# Patient Record
Sex: MALE | Race: WHITE | NOT HISPANIC OR LATINO | URBAN - METROPOLITAN AREA
[De-identification: names, ages, dates, MRNs, and addresses within clinical notes are randomized per-mention and may not be internally consistent; named-entity substitution may affect disease eponyms.]

---

## 2017-05-25 ENCOUNTER — EMERGENCY (EMERGENCY)
Facility: HOSPITAL | Age: 42
LOS: 1 days | Discharge: HOME | End: 2017-05-25
Attending: EMERGENCY MEDICINE | Admitting: EMERGENCY MEDICINE
Payer: MEDICAID

## 2017-05-25 VITALS
OXYGEN SATURATION: 96 % | RESPIRATION RATE: 16 BRPM | TEMPERATURE: 99 F | HEART RATE: 69 BPM | SYSTOLIC BLOOD PRESSURE: 141 MMHG | DIASTOLIC BLOOD PRESSURE: 85 MMHG

## 2017-05-25 VITALS — DIASTOLIC BLOOD PRESSURE: 89 MMHG | HEART RATE: 70 BPM | TEMPERATURE: 99 F | SYSTOLIC BLOOD PRESSURE: 120 MMHG

## 2017-05-25 DIAGNOSIS — Z79.899 OTHER LONG TERM (CURRENT) DRUG THERAPY: ICD-10-CM

## 2017-05-25 DIAGNOSIS — R07.89 OTHER CHEST PAIN: ICD-10-CM

## 2017-05-25 LAB
ALBUMIN SERPL ELPH-MCNC: 3.9 G/DL — SIGNIFICANT CHANGE UP (ref 3.4–5)
ALP SERPL-CCNC: 63 U/L — SIGNIFICANT CHANGE UP (ref 40–120)
ALT FLD-CCNC: 44 U/L — HIGH (ref 12–42)
ANION GAP SERPL CALC-SCNC: 7 MMOL/L — LOW (ref 9–16)
AST SERPL-CCNC: 22 U/L — SIGNIFICANT CHANGE UP (ref 15–37)
BASOPHILS NFR BLD AUTO: 0.7 % — SIGNIFICANT CHANGE UP (ref 0–2)
BILIRUB SERPL-MCNC: 0.4 MG/DL — SIGNIFICANT CHANGE UP (ref 0.2–1.2)
BUN SERPL-MCNC: 21 MG/DL — SIGNIFICANT CHANGE UP (ref 7–23)
CALCIUM SERPL-MCNC: 8.9 MG/DL — SIGNIFICANT CHANGE UP (ref 8.5–10.5)
CHLORIDE SERPL-SCNC: 107 MMOL/L — SIGNIFICANT CHANGE UP (ref 96–108)
CK MB BLD-MCNC: 0.78 % — SIGNIFICANT CHANGE UP
CK MB CFR SERPL CALC: 0.7 NG/ML — SIGNIFICANT CHANGE UP (ref 0.5–3.6)
CK SERPL-CCNC: 90 U/L — SIGNIFICANT CHANGE UP (ref 39–308)
CO2 SERPL-SCNC: 26 MMOL/L — SIGNIFICANT CHANGE UP (ref 22–31)
CREAT SERPL-MCNC: 1.06 MG/DL — SIGNIFICANT CHANGE UP (ref 0.5–1.3)
EOSINOPHIL NFR BLD AUTO: 2.7 % — SIGNIFICANT CHANGE UP (ref 0–6)
GLUCOSE SERPL-MCNC: 104 MG/DL — HIGH (ref 70–99)
HCT VFR BLD CALC: 39.2 % — SIGNIFICANT CHANGE UP (ref 39–50)
HGB BLD-MCNC: 14 G/DL — SIGNIFICANT CHANGE UP (ref 13–17)
IMM GRANULOCYTES NFR BLD AUTO: 0.4 % — SIGNIFICANT CHANGE UP (ref 0–1.5)
LYMPHOCYTES # BLD AUTO: 20.8 % — SIGNIFICANT CHANGE UP (ref 13–44)
MCHC RBC-ENTMCNC: 32.3 PG — SIGNIFICANT CHANGE UP (ref 27–34)
MCHC RBC-ENTMCNC: 35.7 G/DL — SIGNIFICANT CHANGE UP (ref 32–36)
MCV RBC AUTO: 90.3 FL — SIGNIFICANT CHANGE UP (ref 80–100)
MONOCYTES NFR BLD AUTO: 6 % — SIGNIFICANT CHANGE UP (ref 2–14)
NEUTROPHILS NFR BLD AUTO: 69.4 % — SIGNIFICANT CHANGE UP (ref 43–77)
PLATELET # BLD AUTO: 390 K/UL — SIGNIFICANT CHANGE UP (ref 150–400)
POTASSIUM SERPL-MCNC: 3.9 MMOL/L — SIGNIFICANT CHANGE UP (ref 3.5–5.3)
POTASSIUM SERPL-SCNC: 3.9 MMOL/L — SIGNIFICANT CHANGE UP (ref 3.5–5.3)
PROT SERPL-MCNC: 7.2 G/DL — SIGNIFICANT CHANGE UP (ref 6.4–8.2)
RBC # BLD: 4.34 M/UL — SIGNIFICANT CHANGE UP (ref 4.2–5.8)
RBC # FLD: 11.9 % — SIGNIFICANT CHANGE UP (ref 10.3–16.9)
SODIUM SERPL-SCNC: 140 MMOL/L — SIGNIFICANT CHANGE UP (ref 132–145)
TROPONIN I SERPL-MCNC: <0.017 NG/ML — LOW (ref 0.02–0.06)
WBC # BLD: 7.1 K/UL — SIGNIFICANT CHANGE UP (ref 3.8–10.5)
WBC # FLD AUTO: 7.1 K/UL — SIGNIFICANT CHANGE UP (ref 3.8–10.5)

## 2017-05-25 PROCEDURE — 93010 ELECTROCARDIOGRAM REPORT: CPT

## 2017-05-25 PROCEDURE — 71020: CPT | Mod: 26

## 2017-05-25 PROCEDURE — 99285 EMERGENCY DEPT VISIT HI MDM: CPT | Mod: 25

## 2017-05-25 RX ORDER — ACETAMINOPHEN 500 MG
650 TABLET ORAL ONCE
Qty: 0 | Refills: 0 | Status: COMPLETED | OUTPATIENT
Start: 2017-05-25 | End: 2017-05-25

## 2017-05-25 RX ORDER — IBUPROFEN 200 MG
800 TABLET ORAL ONCE
Qty: 0 | Refills: 0 | Status: COMPLETED | OUTPATIENT
Start: 2017-05-25 | End: 2017-05-25

## 2017-05-25 RX ORDER — ASPIRIN/CALCIUM CARB/MAGNESIUM 324 MG
325 TABLET ORAL ONCE
Qty: 0 | Refills: 0 | Status: COMPLETED | OUTPATIENT
Start: 2017-05-25 | End: 2017-05-25

## 2017-05-25 RX ORDER — FAMOTIDINE 10 MG/ML
20 INJECTION INTRAVENOUS ONCE
Qty: 0 | Refills: 0 | Status: COMPLETED | OUTPATIENT
Start: 2017-05-25 | End: 2017-05-25

## 2017-05-25 RX ADMIN — FAMOTIDINE 20 MILLIGRAM(S): 10 INJECTION INTRAVENOUS at 11:08

## 2017-05-25 RX ADMIN — Medication 325 MILLIGRAM(S): at 11:07

## 2017-05-25 RX ADMIN — Medication 650 MILLIGRAM(S): at 11:06

## 2017-05-25 RX ADMIN — Medication 800 MILLIGRAM(S): at 11:06

## 2017-05-25 NOTE — ED PROVIDER NOTE - OBJECTIVE STATEMENT
42 y.o. male with /co 5 days right sided chest pain, constant, 42 y.o. male with /co 5 days right sided chest pain, constant, ongoing for 5 days, somwhat worse with movement, not xertional, pt plays soccerr and runs a few times a week with no chest pain or SOB, no trauma no recent heavy lifting, opt has hx RUE muscle spasm from when he was a child, Denies fever, chills, palpitations, diaphoresis, FAULKNER, SOB, PND, exertional sx, orthopnea, cough, hemoptysis, wheezing, peripheral edema, focal weakness, numbness, tingling, paresthesia, HA, dizziness, neck pain, N/V/D/C, abdominal pain, change in urinary/bowel function, trauma, fall, rash, and malaise.

## 2017-05-25 NOTE — ED PROVIDER NOTE - MEDICAL DECISION MAKING DETAILS
chest wall pain, chest wall pain, EKG and cardiac enzymes negative, stable for dc home with outpatient cardiology followup.

## 2017-05-25 NOTE — ED ADULT NURSE NOTE - CHPI ED SYMPTOMS NEG
no shortness of breath/no back pain/no vomiting/no nausea/no syncope/no chills/no dizziness/no fever

## 2017-05-25 NOTE — ED PROVIDER NOTE - CARDIAC, MLM
Normal rate, regular rhythm.  Heart sounds S1, S2.  No murmurs, rubs or gallops. Normal rate, regular rhythm.  Heart sounds S1, S2.  No murmurs, rubs or gallops. no chest wall swelling or tenderness

## 2017-10-16 ENCOUNTER — EMERGENCY (EMERGENCY)
Facility: HOSPITAL | Age: 42
LOS: 1 days | Discharge: HOME | End: 2017-10-16
Attending: EMERGENCY MEDICINE | Admitting: EMERGENCY MEDICINE
Payer: SELF-PAY

## 2017-10-16 VITALS
HEART RATE: 69 BPM | HEIGHT: 63 IN | OXYGEN SATURATION: 98 % | TEMPERATURE: 99 F | WEIGHT: 179.9 LBS | DIASTOLIC BLOOD PRESSURE: 80 MMHG | RESPIRATION RATE: 17 BRPM | SYSTOLIC BLOOD PRESSURE: 114 MMHG

## 2017-10-16 DIAGNOSIS — R10.33 PERIUMBILICAL PAIN: ICD-10-CM

## 2017-10-16 DIAGNOSIS — R11.0 NAUSEA: ICD-10-CM

## 2017-10-16 DIAGNOSIS — Z79.52 LONG TERM (CURRENT) USE OF SYSTEMIC STEROIDS: ICD-10-CM

## 2017-10-16 DIAGNOSIS — R63.0 ANOREXIA: ICD-10-CM

## 2017-10-16 DIAGNOSIS — Z79.899 OTHER LONG TERM (CURRENT) DRUG THERAPY: ICD-10-CM

## 2017-10-16 DIAGNOSIS — R19.7 DIARRHEA, UNSPECIFIED: ICD-10-CM

## 2017-10-16 LAB
BASOPHILS NFR BLD AUTO: 0.5 % — SIGNIFICANT CHANGE UP (ref 0–2)
EOSINOPHIL NFR BLD AUTO: 6.9 % — HIGH (ref 0–6)
HCT VFR BLD CALC: 30.4 % — LOW (ref 39–50)
HGB BLD-MCNC: 10.5 G/DL — LOW (ref 13–17)
IMM GRANULOCYTES NFR BLD AUTO: 0.2 % — SIGNIFICANT CHANGE UP (ref 0–1.5)
LIDOCAIN IGE QN: 97 U/L — SIGNIFICANT CHANGE UP (ref 73–393)
LYMPHOCYTES # BLD AUTO: 21 % — SIGNIFICANT CHANGE UP (ref 13–44)
MCHC RBC-ENTMCNC: 31.7 PG — SIGNIFICANT CHANGE UP (ref 27–34)
MCHC RBC-ENTMCNC: 34.5 G/DL — SIGNIFICANT CHANGE UP (ref 32–36)
MCV RBC AUTO: 91.8 FL — SIGNIFICANT CHANGE UP (ref 80–100)
MONOCYTES NFR BLD AUTO: 7.3 % — SIGNIFICANT CHANGE UP (ref 2–14)
NEUTROPHILS NFR BLD AUTO: 64.1 % — SIGNIFICANT CHANGE UP (ref 43–77)
PLATELET # BLD AUTO: 267 K/UL — SIGNIFICANT CHANGE UP (ref 150–400)
RBC # BLD: 3.31 M/UL — LOW (ref 4.2–5.8)
RBC # FLD: 12.2 % — SIGNIFICANT CHANGE UP (ref 10.3–16.9)
WBC # BLD: 6.3 K/UL — SIGNIFICANT CHANGE UP (ref 3.8–10.5)
WBC # FLD AUTO: 6.3 K/UL — SIGNIFICANT CHANGE UP (ref 3.8–10.5)

## 2017-10-16 PROCEDURE — 99053 MED SERV 10PM-8AM 24 HR FAC: CPT

## 2017-10-16 PROCEDURE — 99285 EMERGENCY DEPT VISIT HI MDM: CPT | Mod: 25

## 2017-10-16 RX ORDER — IOHEXOL 300 MG/ML
50 INJECTION, SOLUTION INTRAVENOUS ONCE
Qty: 0 | Refills: 0 | Status: COMPLETED | OUTPATIENT
Start: 2017-10-16 | End: 2017-10-16

## 2017-10-16 RX ORDER — MORPHINE SULFATE 50 MG/1
4 CAPSULE, EXTENDED RELEASE ORAL ONCE
Qty: 0 | Refills: 0 | Status: DISCONTINUED | OUTPATIENT
Start: 2017-10-16 | End: 2017-10-16

## 2017-10-16 RX ORDER — SODIUM CHLORIDE 9 MG/ML
1000 INJECTION INTRAMUSCULAR; INTRAVENOUS; SUBCUTANEOUS ONCE
Qty: 0 | Refills: 0 | Status: COMPLETED | OUTPATIENT
Start: 2017-10-16 | End: 2017-10-16

## 2017-10-16 RX ORDER — FAMOTIDINE 10 MG/ML
20 INJECTION INTRAVENOUS ONCE
Qty: 0 | Refills: 0 | Status: COMPLETED | OUTPATIENT
Start: 2017-10-16 | End: 2017-10-16

## 2017-10-16 RX ORDER — ONDANSETRON 8 MG/1
4 TABLET, FILM COATED ORAL ONCE
Qty: 0 | Refills: 0 | Status: COMPLETED | OUTPATIENT
Start: 2017-10-16 | End: 2017-10-16

## 2017-10-16 RX ADMIN — ONDANSETRON 4 MILLIGRAM(S): 8 TABLET, FILM COATED ORAL at 23:44

## 2017-10-16 RX ADMIN — IOHEXOL 50 MILLILITER(S): 300 INJECTION, SOLUTION INTRAVENOUS at 23:44

## 2017-10-16 RX ADMIN — SODIUM CHLORIDE 1000 MILLILITER(S): 9 INJECTION INTRAMUSCULAR; INTRAVENOUS; SUBCUTANEOUS at 23:44

## 2017-10-16 RX ADMIN — MORPHINE SULFATE 4 MILLIGRAM(S): 50 CAPSULE, EXTENDED RELEASE ORAL at 23:44

## 2017-10-16 NOTE — ED PROVIDER NOTE - PROGRESS NOTE DETAILS
patient eating and drinking with no pain which resolved, ct results discussed. he is feeling much better. will dc, with strict return precautions. will dc, agrees with plan.

## 2017-10-16 NOTE — ED PROVIDER NOTE - MEDICAL DECISION MAKING DETAILS
ct abd/p rule out colitis, divertic, appy. patient otherwise nontoxic appearing, analgesics, ivf, ua and abd labs.

## 2017-10-16 NOTE — ED ADULT NURSE NOTE - CHPI ED SYMPTOMS NEG
no vomiting/no burning urination/no chills/no dysuria/no nausea/no hematuria/no abdominal distension/no blood in stool/no fever

## 2017-10-16 NOTE — ED ADULT NURSE NOTE - OBJECTIVE STATEMENT
no N/V some diarrhea non bloody  started yesterday Pt came in c/o midabdominal pain that began yesterday with nonbloody diarrhea. Pt denies any nauseousness. Pt denies any N/V, no fever/chills, no CP/SOB. Pt reports no change in PO intake.

## 2017-10-16 NOTE — ED PROVIDER NOTE - OBJECTIVE STATEMENT
Patient with no significant pmhx, presents with periumbilical abdomina pain for one day, associated with mild nausea, and decreased appetite. also notes 3 episodes of watery diarrhea pta. denies fever, chills, travel or sick contacts. denies urinary symptoms, bloody stool or hx of abd surgeries. social drinker, denies drugs or smoking.

## 2017-10-17 VITALS
OXYGEN SATURATION: 99 % | HEART RATE: 60 BPM | RESPIRATION RATE: 16 BRPM | SYSTOLIC BLOOD PRESSURE: 128 MMHG | DIASTOLIC BLOOD PRESSURE: 79 MMHG

## 2017-10-17 LAB
ALBUMIN SERPL ELPH-MCNC: 3.6 G/DL — SIGNIFICANT CHANGE UP (ref 3.4–5)
ALP SERPL-CCNC: 81 U/L — SIGNIFICANT CHANGE UP (ref 40–120)
ALT FLD-CCNC: 21 U/L — SIGNIFICANT CHANGE UP (ref 12–42)
ANION GAP SERPL CALC-SCNC: 7 MMOL/L — LOW (ref 9–16)
APPEARANCE UR: CLEAR — SIGNIFICANT CHANGE UP
AST SERPL-CCNC: 32 U/L — SIGNIFICANT CHANGE UP (ref 15–37)
BILIRUB SERPL-MCNC: 0.4 MG/DL — SIGNIFICANT CHANGE UP (ref 0.2–1.2)
BILIRUB UR-MCNC: NEGATIVE — SIGNIFICANT CHANGE UP
BUN SERPL-MCNC: 15 MG/DL — SIGNIFICANT CHANGE UP (ref 7–23)
CALCIUM SERPL-MCNC: 8.3 MG/DL — LOW (ref 8.5–10.5)
CHLORIDE SERPL-SCNC: 106 MMOL/L — SIGNIFICANT CHANGE UP (ref 96–108)
CO2 SERPL-SCNC: 26 MMOL/L — SIGNIFICANT CHANGE UP (ref 22–31)
COLOR SPEC: YELLOW — SIGNIFICANT CHANGE UP
CREAT SERPL-MCNC: 0.86 MG/DL — SIGNIFICANT CHANGE UP (ref 0.5–1.3)
DIFF PNL FLD: NEGATIVE — SIGNIFICANT CHANGE UP
GLUCOSE SERPL-MCNC: 100 MG/DL — HIGH (ref 70–99)
GLUCOSE UR QL: NEGATIVE — SIGNIFICANT CHANGE UP
KETONES UR-MCNC: NEGATIVE — SIGNIFICANT CHANGE UP
LEUKOCYTE ESTERASE UR-ACNC: NEGATIVE — SIGNIFICANT CHANGE UP
MAGNESIUM SERPL-MCNC: 2 MG/DL — SIGNIFICANT CHANGE UP (ref 1.6–2.6)
NITRITE UR-MCNC: NEGATIVE — SIGNIFICANT CHANGE UP
PH UR: 5.5 — SIGNIFICANT CHANGE UP (ref 5–8)
POTASSIUM SERPL-MCNC: 4.7 MMOL/L — SIGNIFICANT CHANGE UP (ref 3.5–5.3)
POTASSIUM SERPL-SCNC: 4.7 MMOL/L — SIGNIFICANT CHANGE UP (ref 3.5–5.3)
PROT SERPL-MCNC: 7.3 G/DL — SIGNIFICANT CHANGE UP (ref 6.4–8.2)
PROT UR-MCNC: NEGATIVE MG/DL — SIGNIFICANT CHANGE UP
SODIUM SERPL-SCNC: 139 MMOL/L — SIGNIFICANT CHANGE UP (ref 132–145)
SP GR SPEC: <=1.005 — SIGNIFICANT CHANGE UP (ref 1–1.03)
UROBILINOGEN FLD QL: 0.2 E.U./DL — SIGNIFICANT CHANGE UP

## 2017-10-17 PROCEDURE — 74177 CT ABD & PELVIS W/CONTRAST: CPT | Mod: 26

## 2017-10-17 RX ORDER — KETOROLAC TROMETHAMINE 30 MG/ML
30 SYRINGE (ML) INJECTION ONCE
Qty: 0 | Refills: 0 | Status: DISCONTINUED | OUTPATIENT
Start: 2017-10-17 | End: 2017-10-17

## 2017-10-17 RX ORDER — FAMOTIDINE 10 MG/ML
1 INJECTION INTRAVENOUS
Qty: 30 | Refills: 0
Start: 2017-10-17 | End: 2017-11-01

## 2017-10-17 RX ORDER — SUCRALFATE 1 G
10 TABLET ORAL
Qty: 400 | Refills: 0
Start: 2017-10-17 | End: 2017-11-01

## 2017-10-17 RX ADMIN — MORPHINE SULFATE 4 MILLIGRAM(S): 50 CAPSULE, EXTENDED RELEASE ORAL at 00:16

## 2017-10-17 RX ADMIN — FAMOTIDINE 20 MILLIGRAM(S): 10 INJECTION INTRAVENOUS at 00:52

## 2017-10-17 RX ADMIN — Medication 30 MILLIGRAM(S): at 03:46

## 2017-10-17 NOTE — ED ADULT NURSE REASSESSMENT NOTE - NS ED NURSE REASSESS COMMENT FT1
patient had iv contrast for ct abd; felt itching in throat after contrast given; feels better after test completes; no angioedema, no lip/tongue swelling, no wheezing, no hives/itching/rashes noted; will continue to monitor, VSS; MD Almeida made aware; pt speaking in complete sentences

## 2018-09-13 NOTE — ED PROVIDER NOTE - CROS ED HEME ALL NEG
Chief Complaint Patient presents with  Surgical Follow-up Ventral hernia repair 9/2/18 1. Have you been to the ER, urgent care clinic since your last visit? Hospitalized since your last visit? No 
 
2. Have you seen or consulted any other health care providers outside of the [address] [place] [address] since your last visit? Include any pap smears or colon screening.  No 
 
 negative...

## 2018-12-16 NOTE — ED PROVIDER NOTE - CPE EDP EYES NORM
Telephone Encounter by Sheela Drummond LPN at 07/22/17 10:58 AM     Author:  Sheela Drummond LPN Service:  (none) Author Type:  Licensed Nurse     Filed:  07/22/17 11:01 AM Encounter Date:  7/22/2017 Status:  Signed     :  Sheela Drummond LPN (Licensed Nurse)            To[DP1.1M] Dr Cameron[DP1.1T]   Camilla calling from [facility] [employer]  States she has a beal catheter    Completed Macrobid couple days ago last week   They have orders repeat the ua on the 23 rd    Today much more confused. dtr states this is newell pt presents herself when has a UTI[DP1.1M]  dtr told staff one time pt was in hospital d/t UTI for IV antibiotics and want to avoid that from happening again[DP1.2M]     Asking for orders:[DP1.1M]       Revision History        User Key Date/Time User Provider Type Action    > DP1.2 07/22/17 11:01 AM Sheela Drummond LPN Licensed Nurse Sign     DP1.1 07/22/17 10:58 AM Sheela Drummond LPN Licensed Nurse     M - Manual, T - Template             normal...

## 2019-05-27 ENCOUNTER — EMERGENCY (EMERGENCY)
Facility: HOSPITAL | Age: 44
LOS: 1 days | Discharge: HOME | End: 2019-05-27
Admitting: EMERGENCY MEDICINE
Payer: SELF-PAY

## 2019-05-27 VITALS
OXYGEN SATURATION: 98 % | HEART RATE: 83 BPM | RESPIRATION RATE: 16 BRPM | SYSTOLIC BLOOD PRESSURE: 117 MMHG | TEMPERATURE: 99 F | DIASTOLIC BLOOD PRESSURE: 78 MMHG

## 2019-05-27 VITALS
RESPIRATION RATE: 15 BRPM | OXYGEN SATURATION: 97 % | HEART RATE: 86 BPM | TEMPERATURE: 99 F | SYSTOLIC BLOOD PRESSURE: 119 MMHG | DIASTOLIC BLOOD PRESSURE: 79 MMHG

## 2019-05-27 LAB
ALBUMIN SERPL ELPH-MCNC: 3.7 G/DL — SIGNIFICANT CHANGE UP (ref 3.4–5)
ALP SERPL-CCNC: 89 U/L — SIGNIFICANT CHANGE UP (ref 40–120)
ALT FLD-CCNC: 45 U/L — HIGH (ref 12–42)
ANION GAP SERPL CALC-SCNC: 12 MMOL/L — SIGNIFICANT CHANGE UP (ref 9–16)
APPEARANCE UR: CLEAR — SIGNIFICANT CHANGE UP
AST SERPL-CCNC: 25 U/L — SIGNIFICANT CHANGE UP (ref 15–37)
BILIRUB SERPL-MCNC: 0.7 MG/DL — SIGNIFICANT CHANGE UP (ref 0.2–1.2)
BILIRUB UR-MCNC: NEGATIVE — SIGNIFICANT CHANGE UP
BUN SERPL-MCNC: 15 MG/DL — SIGNIFICANT CHANGE UP (ref 7–23)
CALCIUM SERPL-MCNC: 9.1 MG/DL — SIGNIFICANT CHANGE UP (ref 8.5–10.5)
CHLORIDE SERPL-SCNC: 103 MMOL/L — SIGNIFICANT CHANGE UP (ref 96–108)
CO2 SERPL-SCNC: 24 MMOL/L — SIGNIFICANT CHANGE UP (ref 22–31)
COLOR SPEC: YELLOW — SIGNIFICANT CHANGE UP
CREAT SERPL-MCNC: 1.05 MG/DL — SIGNIFICANT CHANGE UP (ref 0.5–1.3)
DIFF PNL FLD: ABNORMAL
GLUCOSE SERPL-MCNC: 110 MG/DL — HIGH (ref 70–99)
GLUCOSE UR QL: NEGATIVE — SIGNIFICANT CHANGE UP
HCT VFR BLD CALC: 40.8 % — SIGNIFICANT CHANGE UP (ref 39–50)
HGB BLD-MCNC: 14.2 G/DL — SIGNIFICANT CHANGE UP (ref 13–17)
KETONES UR-MCNC: NEGATIVE — SIGNIFICANT CHANGE UP
LEUKOCYTE ESTERASE UR-ACNC: NEGATIVE — SIGNIFICANT CHANGE UP
LIDOCAIN IGE QN: 80 U/L — SIGNIFICANT CHANGE UP (ref 73–393)
MCHC RBC-ENTMCNC: 31.8 PG — SIGNIFICANT CHANGE UP (ref 27–34)
MCHC RBC-ENTMCNC: 34.8 G/DL — SIGNIFICANT CHANGE UP (ref 32–36)
MCV RBC AUTO: 91.3 FL — SIGNIFICANT CHANGE UP (ref 80–100)
NITRITE UR-MCNC: NEGATIVE — SIGNIFICANT CHANGE UP
PH UR: 6 — SIGNIFICANT CHANGE UP (ref 5–8)
PLATELET # BLD AUTO: 349 K/UL — SIGNIFICANT CHANGE UP (ref 150–400)
POTASSIUM SERPL-MCNC: 4 MMOL/L — SIGNIFICANT CHANGE UP (ref 3.5–5.3)
POTASSIUM SERPL-SCNC: 4 MMOL/L — SIGNIFICANT CHANGE UP (ref 3.5–5.3)
PROT SERPL-MCNC: 7.7 G/DL — SIGNIFICANT CHANGE UP (ref 6.4–8.2)
PROT UR-MCNC: ABNORMAL MG/DL
RBC # BLD: 4.47 M/UL — SIGNIFICANT CHANGE UP (ref 4.2–5.8)
RBC # FLD: 12 % — SIGNIFICANT CHANGE UP (ref 10.3–14.5)
SODIUM SERPL-SCNC: 139 MMOL/L — SIGNIFICANT CHANGE UP (ref 132–145)
SP GR SPEC: 1.02 — SIGNIFICANT CHANGE UP (ref 1–1.03)
UROBILINOGEN FLD QL: 0.2 E.U./DL — SIGNIFICANT CHANGE UP
WBC # BLD: 12.4 K/UL — HIGH (ref 3.8–10.5)
WBC # FLD AUTO: 12.4 K/UL — HIGH (ref 3.8–10.5)

## 2019-05-27 PROCEDURE — 71046 X-RAY EXAM CHEST 2 VIEWS: CPT | Mod: 26

## 2019-05-27 PROCEDURE — 76705 ECHO EXAM OF ABDOMEN: CPT | Mod: 26

## 2019-05-27 PROCEDURE — 93010 ELECTROCARDIOGRAM REPORT: CPT

## 2019-05-27 PROCEDURE — 99285 EMERGENCY DEPT VISIT HI MDM: CPT | Mod: 25

## 2019-05-27 PROCEDURE — 74177 CT ABD & PELVIS W/CONTRAST: CPT | Mod: 26

## 2019-05-27 RX ORDER — MORPHINE SULFATE 50 MG/1
4 CAPSULE, EXTENDED RELEASE ORAL ONCE
Refills: 0 | Status: DISCONTINUED | OUTPATIENT
Start: 2019-05-27 | End: 2019-05-27

## 2019-05-27 RX ORDER — AZITHROMYCIN 500 MG/1
500 TABLET, FILM COATED ORAL ONCE
Refills: 0 | Status: COMPLETED | OUTPATIENT
Start: 2019-05-27 | End: 2019-05-27

## 2019-05-27 RX ORDER — FAMOTIDINE 10 MG/ML
20 INJECTION INTRAVENOUS ONCE
Refills: 0 | Status: COMPLETED | OUTPATIENT
Start: 2019-05-27 | End: 2019-05-27

## 2019-05-27 RX ORDER — IOHEXOL 300 MG/ML
30 INJECTION, SOLUTION INTRAVENOUS ONCE
Refills: 0 | Status: COMPLETED | OUTPATIENT
Start: 2019-05-27 | End: 2019-05-27

## 2019-05-27 RX ORDER — SODIUM CHLORIDE 9 MG/ML
3 INJECTION INTRAMUSCULAR; INTRAVENOUS; SUBCUTANEOUS ONCE
Refills: 0 | Status: COMPLETED | OUTPATIENT
Start: 2019-05-27 | End: 2019-05-27

## 2019-05-27 RX ORDER — AZITHROMYCIN 500 MG/1
1 TABLET, FILM COATED ORAL
Qty: 4 | Refills: 0
Start: 2019-05-27 | End: 2019-05-30

## 2019-05-27 RX ORDER — SODIUM CHLORIDE 9 MG/ML
1000 INJECTION INTRAMUSCULAR; INTRAVENOUS; SUBCUTANEOUS ONCE
Refills: 0 | Status: COMPLETED | OUTPATIENT
Start: 2019-05-27 | End: 2019-05-27

## 2019-05-27 RX ORDER — PANTOPRAZOLE SODIUM 20 MG/1
1 TABLET, DELAYED RELEASE ORAL
Qty: 30 | Refills: 0
Start: 2019-05-27 | End: 2019-06-25

## 2019-05-27 RX ADMIN — AZITHROMYCIN 500 MILLIGRAM(S): 500 TABLET, FILM COATED ORAL at 18:36

## 2019-05-27 RX ADMIN — SODIUM CHLORIDE 1000 MILLILITER(S): 9 INJECTION INTRAMUSCULAR; INTRAVENOUS; SUBCUTANEOUS at 15:15

## 2019-05-27 RX ADMIN — IOHEXOL 30 MILLILITER(S): 300 INJECTION, SOLUTION INTRAVENOUS at 15:16

## 2019-05-27 RX ADMIN — SODIUM CHLORIDE 1000 MILLILITER(S): 9 INJECTION INTRAMUSCULAR; INTRAVENOUS; SUBCUTANEOUS at 16:19

## 2019-05-27 RX ADMIN — MORPHINE SULFATE 4 MILLIGRAM(S): 50 CAPSULE, EXTENDED RELEASE ORAL at 15:16

## 2019-05-27 RX ADMIN — FAMOTIDINE 20 MILLIGRAM(S): 10 INJECTION INTRAVENOUS at 15:16

## 2019-05-27 RX ADMIN — SODIUM CHLORIDE 3 MILLILITER(S): 9 INJECTION INTRAMUSCULAR; INTRAVENOUS; SUBCUTANEOUS at 13:56

## 2019-05-27 NOTE — ED PROVIDER NOTE - OBJECTIVE STATEMENT
45 y/o Male with no known PMHx presents to the ED c/o periumbilical tenderness for the last 3 weeks and a throbbing headache for the last 3 days, and states his symptoms are worsening which prompted him to come to the ER. Pain is localized in the periumbilical area. Denies N/V/D, non-bloody stools, alcohol intake, abdominal surgeries, CP, SOB, cough, or sick contacts. Last normal BM was yesterday and reports it was normal. His appetite has been normal until today where he has not been able to eat. Abdominal pain is not relieved by eating or with bowel movements. Pt has taken Tylenol and Aspiring for his headache but with no relief.

## 2019-05-27 NOTE — ED PROVIDER NOTE - NSFOLLOWUPINSTRUCTIONS_ED_ALL_ED_FT
Follow up with primary care provider.  Hydrate well.  Take Zpak as prescribed until complete.    Follow up with gastroenterology for further evaluation.  Take Protonix as prescribed.    Return for fever, shortness of breath, chest pain, vomiting, blood in stool or other concerns.

## 2019-05-27 NOTE — ED PROVIDER NOTE - CLINICAL SUMMARY MEDICAL DECISION MAKING FREE TEXT BOX
44 M presents to ED c/o abd pain.  Pt well appearing. VSS.  NAD.  Labs wnl.  WBC 12.  RUQ u/s negative.  CT abd/pelvis shows LLL consolidation.   CXR ordered. 44 M presents to ED c/o abd pain.  Pt well appearing. VSS.  NAD.  Labs wnl.  WBC 12.  RUQ u/s negative.  CT abd/pelvis shows LLL consolidation.   CXR ordered confirms LLL pneumonia.  No other consolidations.  Pt treated with Zpak.  Advised to f/u with primary care and GI for H.pylori testing and possible endoscopy.  Will Rx PPI.  Return precautions advised..

## 2019-05-30 DIAGNOSIS — R10.33 PERIUMBILICAL PAIN: ICD-10-CM

## 2019-05-30 DIAGNOSIS — J18.1 LOBAR PNEUMONIA, UNSPECIFIED ORGANISM: ICD-10-CM

## 2019-05-30 DIAGNOSIS — R51 HEADACHE: ICD-10-CM

## 2020-10-01 NOTE — ED PROVIDER NOTE - NS ED MD EM SELECTION
[id number] Comprehensive Where Do You Want The Question To Include Opioid Counseling Located?: Case Summary Tab

## 2022-01-11 ENCOUNTER — EMERGENCY (EMERGENCY)
Facility: HOSPITAL | Age: 47
LOS: 1 days | Discharge: HOME | End: 2022-01-11
Attending: EMERGENCY MEDICINE | Admitting: EMERGENCY MEDICINE
Payer: SELF-PAY

## 2022-01-11 VITALS
TEMPERATURE: 98 F | DIASTOLIC BLOOD PRESSURE: 65 MMHG | OXYGEN SATURATION: 96 % | WEIGHT: 179.9 LBS | HEART RATE: 84 BPM | HEIGHT: 63 IN | SYSTOLIC BLOOD PRESSURE: 131 MMHG | RESPIRATION RATE: 16 BRPM
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
## 2022-01-11 DIAGNOSIS — S05.01XA INJURY OF CONJUNCTIVA AND CORNEAL ABRASION WITHOUT FOREIGN BODY, RIGHT EYE, INITIAL ENCOUNTER: ICD-10-CM

## 2022-01-11 DIAGNOSIS — T15.01XA FOREIGN BODY IN CORNEA, RIGHT EYE, INITIAL ENCOUNTER: ICD-10-CM
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
## 2022-01-11 PROCEDURE — 99283 EMERGENCY DEPT VISIT LOW MDM: CPT

## 2022-01-11 RX ORDER — IBUPROFEN 200 MG
1 TABLET ORAL
Qty: 21 | Refills: 0
Start: 2022-01-11 | End: 2022-01-17

## 2022-01-11 RX ORDER — OXYCODONE AND ACETAMINOPHEN 5; 325 MG/1; MG/1
2 TABLET ORAL ONCE
Refills: 0 | Status: DISCONTINUED | OUTPATIENT
Start: 2022-01-11 | End: 2022-01-11

## 2022-01-11 RX ORDER — IBUPROFEN 200 MG
800 TABLET ORAL ONCE
Refills: 0 | Status: COMPLETED | OUTPATIENT
Start: 2022-01-11 | End: 2022-01-11

## 2022-01-11 RX ORDER — POLYMYXIN B SULF/TRIMETHOPRIM 10000-1/ML
1 DROPS OPHTHALMIC (EYE)
Qty: 1 | Refills: 0
Start: 2022-01-11 | End: 2022-01-17

## 2022-01-11 RX ORDER — ACETAMINOPHEN 500 MG
975 TABLET ORAL ONCE
Refills: 0 | Status: COMPLETED | OUTPATIENT
Start: 2022-01-11 | End: 2022-01-11

## 2022-01-11 RX ADMIN — OXYCODONE AND ACETAMINOPHEN 2 TABLET(S): 5; 325 TABLET ORAL at 22:44

## 2022-01-11 RX ADMIN — Medication 800 MILLIGRAM(S): at 22:42

## 2022-01-11 RX ADMIN — Medication 975 MILLIGRAM(S): at 22:42

## 2022-01-11 NOTE — ED ADULT NURSE NOTE - NSICDXPASTMEDICALHX_GEN_ALL_CORE_FT
[employer] [facility]          Daily Note  Visit Count: 16  Referred by: Dr. Anahy Martin MD  Diagnosis: No diagnosis found.   Hip impingement syndrome, left [M25.852]  - Primary       S/P hip arthroscopy [Z98.890]       Numbness and tingling of left leg [R20.0, R20.2]       Lumbar disc disease with radiculopathy [M51.16]       SI (sacroiliac) joint dysfunction [M53.3]       Pelvic obliquity [M95.5]          Next Referring Provider Visit: 10/30/2020     Insurance: [employer]  Claim Number: [id number]  Claim Status: claim open and in good standing  Current Work Status: full time occupation: CNA; restricted duty due to current condition  Adjustor/: Kristin Jackson  Phone number: [phone]; E-mail: [email or web address]     Date of Injury: 12/5/2019  Surgery: date of surgery: 7/27/2020; surgery performed: left hip arthroscopy, rim trimming, labral repair  Precautions: Physician Activity/Work Restrictions: See Below     Work Status:  Job Title: CNA  Current Employer: [employer]  :   Last Date Worked:   Restrictions:  11/2/2020:  If have work start at 4 hours a day for 2 weeks then increase to 8 hours a day.  Will also need to attend work hardening to build stamina and strength      Lifting 20 pounds maximum      Walk or stand for about 20-30 min per hour then sit for the rest of the hour as tolerated     Stairs seldom with using a hand rail for support, avoid carrying more than about 5# on stairs      Seated work in an ergonomically comfortable chair with adequate back support (ie office type chair, not a stool,  Needs seat pan and back support for her body size, adjustable height for work surface.       Return to Work Date:   Job Description Obtained: no  Job Site Visit: No, due to COVID     Case Notes/Attendance:  Date of Communication: ; Results: none at this time  Attendance Concerns: none at this time    SUBJECTIVE   No significant changes.  Symptoms are about the  PAST MEDICAL HISTORY:  No pertinent past medical history      same.  Current pain: 2-3/10 low back.    OBJECTIVE   Functional Status:  Functional Task Initial Client Abilities Client Abilities 12/18/20 Client Abilities 1/8/21 Job Demand:  Client Report Task Details or Descriptions (complete as needed) Match?   Lift: floor to waist 20 lbs 30 lbs 40 lbs 50 lbs  []? N  [x]? O []? F  []? C    []? Yes [x]? No   Lift: waist to shoulder 20 lbs 30 lbs 30 lbs (40 lbs waist to mid-trunk) 20 lbs  []? N  [x]? O []? F  []? C    [x]? Yes []? No   Lift: shoulder to overhead 20 lbs 20 lbs 20 lbs 10 lbs  []? N  [x]? O []? F  []? C    [x]? Yes []? No   Two Hand Carry 20 lbs 30 lbs 40 lbs 50 lbs  []? N  [x]? O []? F  []? C    []? Yes [x]? No   Simulated patient stand-pivot transfer To be assessed 30 lbs 40 lbs 50 bs  []? N  []? O []? F  []? C    []? Yes [x]? No   Push 40 lbs of force 40 lbs 40 lbs lbs of force  []? N  []? O [x]? F  []? C    [x]? Yes []? No   Pull 40 lbs of force 40 lbs 40 lbs lbs of force  []? N  []? O [x]? F  []? C    [x]? Yes []? No   Standing occasional constant constant []? N  []? O []? F  [x]? C    [x]? Yes []? No   Walking occasional frequent frequent []? N  []? O [x]? F  []? C    [x]? Yes []? No   Sitting frequent frequent frequent []? N  [x]? O []? F  []? C    [x]? Yes []? No   Stooping occasional occasional occasional []? N  []? O [x]? F  []? C    []? Yes [x]? No   Squatting occasional occasional frequent []? N  []? O [x]? F  []? C    []? Yes [x]? No   Kneeling occasional occasional occasional []? N  [x]? O []? F  []? C    [x]? Yes []? No   Definitions: Never (N); Occasionally (O) = up to 1/3 of the day; Frequently (F) = 1/3 to 2/3 of the day; Constantly (C) = 2/3 to the full day     Completed task sheet.  Refer to task sheets for specifics.    Work Hardening/Conditioning:  Completion of fitness program and work simulation activities.  Completion of cardiovascular conditioning, stretching program, core strengthening, UE strengthening, and LE strengthening activities.   Rotated 10 minute work simulation circuits with fitness program tasks: lifting 40 lbs. 12\" to waist, waist to chest, and stand-pivot patient transfer simulation; push/pull 35 lbs. force on sled.    ASSESSMENT   Demonstrates gradually improving activity tolerance as able to increased duration and repetition of lifting/carrying/push/pull tasks in work simulation circuit.  Patient did not feel safe to increased lift weights this session.  Pain after treatment: 3/10.  Result of above outlined education: Verbalizes understanding and Demonstrates understanding    PLAN   Progress fitness program and work simulation activities.     Procedures and total treatment time documented in Time Entry flowsheet.

## 2022-01-11 NOTE — ED PROVIDER NOTE - OBJECTIVE STATEMENT
47 yo male with c/o FB sensation to R eye, pt was walking in the street Thursday then felt sudden onset of FB sensation to R eye, persisted since then despite irrigating his eye at home. No fever, no chills, no discharge. Vision slightly worse due to R eye tearing, had a childhood hx L eye injury with impaired vision since he was a child but has never required corrective lenses.

## 2022-01-11 NOTE — ED PROVIDER NOTE - NSFOLLOWUPCLINICS_GEN_ALL_ED_FT
[facility] Ophthalmology Clinic  Ophthalmology  [address]st [place] [address]  Phone: [phone]  Fax:   Follow Up Time: 1-3 Days    [facility]  Ophthalmology  [address] [address]  Phone: [phone]  Fax:   Follow Up Time: 1-3 Days

## 2022-01-11 NOTE — ED ADULT TRIAGE NOTE - CHIEF COMPLAINT QUOTE
Pt presents c/o painless sensation of object in right eye w/ blurriness since Saturday.  Pt denies DC or pain.

## 2022-01-11 NOTE — ED PROVIDER NOTE - NSFOLLOWUPINSTRUCTIONS_ED_ALL_ED_FT
PLEASE FOLLOW UP AS SOON AS POSSIBLE AT   [facility]  Address:  [address] [place] [id number]  Phone:	[phone]    Abrasión corneal       Cabrera abrasión corneal es un rasguño o lesión en la cubierta transparente que cubre la parte frontal del kayla (córnea). [place] puede ser doloroso. Es importante recibir tratamiento para cabrera abrasión corneal. Si alex problema no se trata, puede afectar frances vista (visión).      ¿Cuales son las causas?    • Un pinchazo en el kayla.      •Un objeto en el kayla.      •Frotar demasiado los ojos.      •Ojos muy secos.      •Ciertas infecciones oculares.      •Lentes de contacto que no calzan nunu o se usan por mucho tiempo. También puede lesionarse la córnea al colocarse lentes de contacto en el kayla o al quitárselos.      •Cirujía de kayla.      •Ciertos problemas de la córnea pueden aumentar la posibilidad de cabrera abrasión corneal.      A veces, la causa no se conoce.      ¿Cuáles son los signos o síntomas?    •Dolor de kayla. El dolor puede empeorar cuando abre y nannette el kayla o cuando mueve el kayla.      •Sensación de algo atorado en el kayla.      •Lagrimeo, enrojecimiento y sensibilidad a la armin.      •Tener problemas para mantener el kayla abierto o no poder mantenerlo abierto.      •Visión borrosa.      •Dolor de angel.        ¿Cómo se diagnostica esto?    Puede trabajar con un proveedor de atención médica que se especialice en afecciones oculares (oftalmólogo). Esta afección se puede diagnosticar según frances historial médico, ck síntomas y un examen de la vista.      ¿Cómo se trata esto?    •Lavarse el kayla.      •Quitar cualquier cosa que esté atascada en frances kayla.      •Usar gotas o ungüentos antibióticos para tratar o prevenir cabrera infección.      •Usar cabrera gota dilatadora para disminuir la irritación, la hinchazón y el dolor.      •Usar gotas o ungüentos con esteroides para tratar el enrojecimiento, la irritación o la hinchazón.      • Aplicar un paño húmedo y frío (compresa fría) o cabrera bolsa de hielo para aliviar el dolor      •Coreen analgésicos por vía oral.      En algunos casos, también se puede usar un parche en el kayla o cabrera lente de contacto blanda de vendaje. No se debe usar un parche en el kayla si la abrasión de la córnea estuvo relacionada con el uso de lentes de contacto, ya que puede aumentar la posibilidad de infección en estos ojos.    Use gotas para los ojos o ungüentos según las indicaciones de frances médico.      •Si le recetaron gotas o pomadas antibióticas, utilícelas según las indicaciones de frances médico. No deje de usar el antibiótico aunque empiece a sentirse mejor.      •[place] los medicamentos recetados y de venta alireza solo manish se lo indique frances médico.    •Pregunte a frances médico si el medicamento que le recetaron:  •Requiere que evite conducir o usar maquinaria pesada.    •Puede causar problemas para hacer yudi (estreñimiento). Es posible que deba coreen estas medidas para prevenir o tratar los problemas de defecación:  •Adele suficiente líquido para mantener frances pipí (orina) de color amarillo pálido.      •[place] medicamentos de venta alireza o recetados.      •Coma alimentos ricos en fibra. Estos incluyen frijoles, granos integrales y frutas y verduras frescas.      •Limitar los alimentos con alto contenido de grasas y azúcares procesados. Estos incluyen alimentos fritos o dulces.          Usar un parche en el kayla  Si tiene un parche en el kayla, úselo según las indicaciones de frances médico.  •No conduzca ni utilice maquinaria mientras tenga puesto un parche en el kayla.      •Siga las instrucciones de frances médico acerca de cuándo quitarse el parche.      Instrucciones generales    •Pregúntele a frances médico si puede usar un paño húmedo y frío en el kayla para aliviar el dolor.      •No se frote ni toque el kayla. No te laves el kayla.      •No use lentes de contacto hasta que frances médico le diga que está nunu.      •Evite la armin brillante.      •Evite forzar la vista.      •Asista a todas las visitas de seguimiento según lo indicado por frances médico. Hacer esto puede ayudar a prevenir infecciones y pérdida de la vista.        Póngase en contacto con un médico si:    •Sigue teniendo dolor en los ojos y otros síntomas gilmar más de 2 días.      •Tiene síntomas nuevos, manish más enrojecimiento, ojos llorosos o secreción.      •Tiene secreción que hace que ck párpados se peguen por la mañana.      •Frances parche en el kayla se afloja tanto que puede parpadear.      •Los síntomas regresan después de que frances kayla arianna.        Obtenga ayuda de inmediato si:    •Tiene un dolor muy intenso en los ojos que no mejora con medicamentos.      •Pierde la vista.        Resumen    •Cabrera abrasión corneal es un rasguño o lesión en la cubierta transparente que cubre la parte frontal del kayla (córnea).      •Es importante recibir tratamiento para cabrera abrasión corneal. Si alex problema no se trata, puede afectar frances vista (visión).      •Use gotas para los ojos o ungüentos según las indicaciones de frances médico.      •Si tiene un parche en el kayla, no conduzca ni use maquinaria mientras lo usa.      •Infórmele a frances médico si ck síntomas perez más de 2 días.      Esta información no pretende reemplazar los consejos que le brinde frances proveedor de atención médica. Asegúrese de discutir cualquier pregunta que tenga con frances proveedor de atención médica.      Corneal Abrasion    The cornea is the clear covering at the front and center of the eye. This very thin tissue is made up of many layers. If a scratch or injury causes the corneal epithelium to come off, it is called a corneal abrasion. Symptoms include eye pain, redness, tearing, difficulty keeping eye open, and light sensitivity. Do not drive or operate machinery if your eye is patched.  Antibiotic eye drops may be prescribed to reduce the risk of infection.  It is important to follow up with an ophthalmologist (eye doctor) to ensure proper healing.    SEEK IMMEDIATE MEDICAL CARE IF YOU HAVE ANY OF THE FOLLOWING SYMPTOMS: discharge from eyes, changes in vision, fever, or swelling.

## 2022-01-11 NOTE — ED PROVIDER NOTE - PATIENT PORTAL LINK FT
You can access the FollowMyHealth Patient Portal offered by [facility] by registering at the following website: [email or web address]. By joining [employer]’s FollowMyHealth portal, you will also be able to view your health information using other applications (apps) compatible with our system.

## 2022-01-11 NOTE — ED PROVIDER NOTE - PHYSICAL EXAMINATION
CONSTITUTIONAL: Well-appearing; well-nourished; in no apparent distress.   HEAD: Normocephalic; atraumatic.   EYES:  clear bilaterally, (+) mild R conjunctival injection, tearing, anterior chamber examined (+) uptake with fluorescin at 3 o'clock, punctate area, no obvious FB noted, eyelid everted, no FB noted.   [facility] OD 20/30  OS 20/50   ENT: airway patent  Resp breathing comfortably with no distress  PSYCHOLOGICAL: The patient’s mood and manner are appropriate.

## 2022-09-26 NOTE — ED ADULT NURSE NOTE - OBJECTIVE STATEMENT
Urology Attending Progress Note      Subjective: Reports frequency and dysuria. No pain    Vitals:  /84   Pulse 92   Temp 98.4 °F (36.9 °C) (Oral)   Resp 18   Ht 5' 4\" (1.626 m)   Wt 140 lb 3.4 oz (63.6 kg)   SpO2 94%   BMI 24.07 kg/m²   Temp  Av.1 °F (36.7 °C)  Min: 97 °F (36.1 °C)  Max: 98.6 °F (37 °C)    Intake/Output Summary (Last 24 hours) at 2022 0905  Last data filed at 2022 0748  Gross per 24 hour   Intake 3080.73 ml   Output 1600 ml   Net 1480.73 ml       Exam: Urine clear    Labs:  WBC:    Lab Results   Component Value Date/Time    WBC 25.6 2022 03:16 AM     Hemoglobin/Hematocrit:    Lab Results   Component Value Date/Time    HGB 11.4 2022 03:16 AM    HCT 33.6 2022 03:16 AM     BMP:    Lab Results   Component Value Date/Time     2022 03:16 AM    K 3.9 2022 03:16 AM     2022 03:16 AM    CO2 22 2022 03:16 AM    BUN 29 2022 03:16 AM    LABALBU 3.8 2022 03:17 PM    CREATININE 1.7 2022 03:16 AM    CALCIUM 7.9 2022 03:16 AM    GFRAA 37 2022 03:16 AM    LABGLOM 30 2022 03:16 AM     PT/INR:    Lab Results   Component Value Date/Time    PROTIME 11.0 2020 05:17 AM    INR 0.95 2020 05:17 AM     PTT:    Lab Results   Component Value Date/Time    APTT 33.7 2020 08:10 AM   [APTT    Urine Culture:  ?? Blood Culture: E. coli    Antibiotic Therapy: Merrem    Imaging:   Impression       CHEST:   No acute bony process or consolidation       ABDOMEN AND PELVIS:       Mild right hydronephrosis and mild to moderate left hydronephrosis and hydroureter greater on the left with bladder wall thickening and without definitive obstructing stones. Correlate for cystitis and/or pyelonephritis with some enhancement around the    renal collecting systems suggesting infection, without obstructing stones identified       Nonspecific fluid-filled loops of small bowel may reflect an ileus.  Moderate stool noted throughout the colon without obstructing lesion appreciated. Mild diverticulosis in the sigmoid region. Impression/Plan: 60 yo F admitted with urosepsis, POD#1 sp cystoscopy with bilateral stent insertion.     -Feeling better today. Does report some stent related frequency and dysuria  -Bcx showing E.coli.  Continue IV abx  -Cr improved  -Stents will need to remain in place for ~2 weeks while she recovers from infection  -Call with any questions     ED Louie " chest pain for 5 days" pt. denies N/V

## 2023-05-06 NOTE — ED ADULT TRIAGE NOTE - PAIN RATING/NUMBER SCALE (0-10): ACTIVITY
HPI:  55 yo F with PMH of Asthma, CAD (not on  meds), peripheral neuropathy and PSH of BATOOL, cholecystectomy, right knee surgery presented to [facility] ED on 4/20 with right sided weakness and right facial numbness. Patient was undergoing preparation for colonoscopy. As per daughter at 8am patient was playing with her grandchildren but around 840 am patient was getting dressed and fell and subsequently felt weak after. Daughter reports that patient had some episodes of vomiting at this time. She had a syncopal episode at around 10 am and was witnessed by brother. No head trauma, She regained conscious after few minutes. She remained in bed for the remainder of the day. Daughter reports some slurred speech noted 1230-1PM and also was confused after. and around 4PM, the patient's sister noted right sided weakness at which time EMS was called and patient was brought to ED. No c/o chest pain, shortness of breath, fever, headache, abdominal pain, urinary complaints. No recent travel/sickness/ change in meds. Stroke code in ER: CTH neg for heme, Right PICA distribution acute infarction. CTA with saccular aneurysms of b/l carotids, approximately 0.8 cm on the right and 1.2 x 0.9 cm on the left. Possible tiny third saccular aneurysm on the right Posterior intracranial circulation: Right vertebral arterial occlusion at its dural crossing junction V3 Atlantic and V4 intracranial segments with likely reversal of flow in its intracranial segment from the basilar. Right PICA faintly seen. Brain perfusion: Acute infarction of the right posterior medial cerebellum within the right pica distribution.  Not candidate for TNK/mechanical thrombectomy. CT scan repeated which showed: 1. Brain: Progression of acute infarction within the right cerebellar hemisphere, also extending into the left superior cerebellar hemisphere. New mass effect on the fourth ventricle causing stenosis versus occlusion with new third and lateral ventricular dilatation indicating ventricular obstruction at the level of the fourth ventricle. New upward and downward herniation of cerebellar parenchyma Right carotid system: No hemodynamically significant stenosis. Left carotid system: No hemodynamically significant stenosis. Vertebral circulation: Patent. Anterior intracranial circulation: Intact. Bilateral internal carotid saccular aneurysms. These findings are unchanged. Posterior intracranial circulation:    Improved flow within the right vertebral artery since 4/20/2023. New focal stenosis mid left vertebral artery, etiology uncertain, consider vasospasm and extrinsic compression in addition to new embolic disease. Brain perfusion: Perfusion images demonstrate normalization of the perfusion abnormality in the right cerebellar hemisphere present on 4/20/2023 despite evidence of progression of acute infarction.  Areas of apparent ischemia within the posterior fossa have progressed in extent, also again involving the left posterior cerebral arterial distribution. Tx to [facility] for SOC watch. On admission to [facility], NIHSS 12.  (21 Apr 2023 16:50)    Hospital course:  4/21: Admitted for crani watch, CTH complete w/ unchanged hydrocephalus, taken for emergent occipital craniectomy.   4/22: POD1. Remains intubated overnight, on propofol and dank. [id number]. Pending repeat CTH this am. Given hydralazine 10mg x1. Started on cardene for SBP high 140s-150s. Sub-therapeutic on plavix, therapeutic on asa, rpt asa accumetrics until subtherapeutic. LE dopplers neg for DVT, but showing superficial venous thrombosis in the proximal portion of the right greater saphenous vein. Started on 3% @60 for na goal 145-150. NGT placed by ENT. Febrile, pancultured.  4/23: POD 2. 3% increased to 75. NGT readjusted. UA neg. SBP liberalized to 160. Plan to extubate. 3% decreased to 60. Failed extubation d/t no cuff leak, given 60mg solumedrol, plan to extubate in 6 hrs. SCx1 for post void residual >400, started on cardura at bedtime. New blood in buretrol, stopped SQL. Clot in EVD cleared. Neuro exam improving.   4/24: POD 3. Cont 3% with NS while NPO, start TF today. TF started. LFTs downtrending. Sodium 141. Increased 3% to 50, NS to 30. Repeat BMP ordered for 5:30PM. Tramadol 25 for pain with no relief, oxy 5 and 1g tylenol ordered, stability CT stable, speech language consult for dysarthria. Given hydralazine 10mg IVP for SBP>160, started amlodipine 5mg. C/o mild headache, given fioricet x1, stroke neuro rec SALVADOR and loop recorder placement. Echo with bubble completed, negative for PFO, EF 55-60%. J HFNC started for desats with suctioning.   4/25: POD 4. Cont HFNC. Increased secertions on HFNC, reintubated. CXR confirmed good placement. EVD dropped to 5cmH20 for goal of draining 5-10cc/hr and SG ELENA drain taken off suction. Pending CTH. EVD drained 0cc/hr, dropped to 0. NGT replaced. Dc'd fioricet. Started on PPI for intubation. Increased cardura to 4mg for urinary retention, given an additional 2mg now. Started salt tabs 3 q 6. Given 12.5mg fentanyl x1. NGT replaced, CXR shown in lung. NGT removed, repeat CXR showing no pneumothorax. Pending ENT consult for NGT placement. CTH stable. NGT replaced by ENT, confirmed in proper spot. Restarted TF. [id number]. Na 141 from 146. Increased 3% to 60. EVD raised to 3cmH20. ETT pulled back 1cm by RT.  4/26: POD 5 SOC. Intubated, remains on precedex, ABG ordered with improving PaO2, BMP sodium 148, 3% changed to 30cc/hr, cardura increased to 8mg for tonight, tolerating cpap  4/27: POD 6 SOC. Respiratory rate sustained 6, returned to FVS. Na 151, dc'd 3%, f/u AM sodium. Nurse noticed ETT 19 at the lip and was 20 prior, CXR shows ETT @ 5cm above jono, ET tube advanced 1cm, repeat CXR confirms appropriate placement. Thick inline secretions with suctioning. ENT trach placement Fri likely, PEG likely Mon. Keep ELENA drain until no output, EVD to remain @3. Start ASA 81mg daily tomorrow.   4/28: POD7 SOC, neuro stable, given fentanyl x 1 overnight for presumed discomfort (biting on ETT), remains on full vent support. CTH today stable in comparison to 4/25. 6 cuffed trach placed by ENT in OR, but came down without cuff. Replaced at bedside by ENT. On fentanyl gtt.    4/29: POD8 SOC, JIM overnight. Incision cleaned and dressing changed. On fentanyl gtt. EKG completed due to increased frequency PVCs on telemetry, frequency of PVCs improved with titrating up fentanyl gtt. ABG drawn.  Repeat LE dopplers completed showing persistant superficial thrombus, no DVT. No EVD waveform during day, flushed distally without improvement. Exam unchanged.  EVD dropped to 0 for low output in afternoon.   4/30: POD9. Neuro stable, febrile to 101.3F o/n, given tylenol and pan cx sent. SBP dipped to low 90s o/n, HR stable in 70s with some PVCs, decreased fentanyl gtt and given 500cc bolus of NS x 2. Pend PEG placement Mon and angio Tues. D/c'd ELENA drain today and suture placed. F/u heme recs. Positive UA. Infiltrates found on CXR. Started on Zosyn 4.5g Q6hrs .   5/1: POD 10. Neuro stable. Dc'd fentanyl gtt. PEG failed placement at bedside with Dr. Gant, plan for PEG in OR tomorrow afternoon with Dr. Layton. Dc'd amlodipine and started carvedilol 3.125 BID for PVCs . Made 3% inhalation and mucomyst q8hr. Failed PEG at bedside. Salt tabs made 1 q 6. Decreased cardura to 4mg daily. Urine culture growing E. Coli and sputum culture growing pluralibacter gergoviae and strep species. ID consulted, f/u recs. Failed CPAP trial @ 3pm. Added am labs per heme/onc for hypercoguable workup. Pending repeat LE dopplers in 2-3 days. NGT clogged, removed, ENT failed attempt at replacement, will keep NPO for PEG placement tmw. Repeat xray in am for concern for aspration. Pt abx switched from Zosyn to Ertapenem 1g Q24hrs. per ID recs, possible ESBL growth.   5/2: POD 11. Neuro stable. Pre-op for diagnostic cerebral angiogram and PEG placement. NPO. EVD raised to 5 cmH20. Sputum culture speciated to step pneumoniae, sensitive to ertapenem. 3% inhalation/mucomyst made q 6 hr d/t thick secretions. PEG placed in OR. POD0 dx cerebral angio. EVD raised to 10.   5/3: POD 12. Neuro stable. PEG feeds began @ 10 AM, plan to increase 10cc every 6h per general surgery. Repeat dopplers show stable R superficial thrombus and new L IM calf DVT. Vascular consulted for IVC filter. Plan to get CTH tomorrow.  5/4: POD 13. JIM o/n. s/p IVC filter with vascular today. Pending post-procedure CTH. FOBT negative. Started iron and vitamin c every other day for iron deficiency anemia.   5/5: POD14. CSF cx sent to r/o infection from new gas in right temporal horn seen on CTH. Restarted TF, changed to Jevity 1.2, f/u nutrition recs. EVD raised to 10 today, plan to challenge over the weekend and CTH on Monday, possible VPS next Wednesday. ENT removed sutures today. Salt tabs decreased 1q12.  5/6: POD15 Placed on CPAP briefly with low MV alert, placed back on full vent support. EVD remains open at [id number]. ICPs WNL. Neuro exam stable.     Vital Signs Last 24 Hrs  T(C): 37.3 (06 May 2023 01:19), Max: 37.3 (06 May 2023 01:19)  T(F): 99.2 (06 May 2023 01:19), Max: 99.2 (06 May 2023 01:19)  HR: 63 (06 May 2023 01:00) (60 - 89)  BP: 99/54 (06 May 2023 01:00) (93/48 - 158/64)  BP(mean): 74 (06 May 2023 01:00) (68 - 92)  RR: 16 (06 May 2023 01:00) (15 - 24)  SpO2: 95% (06 May 2023 01:00) (91% - 99%)    Parameters below as of 06 May 2023 01:00  Patient On (Oxygen Delivery Method): ventilator    O2 Concentration (%): 40    I&O's Summary    04 May 2023 07:01  -  05 May 2023 07:00  --------------------------------------------------------  IN: 2760 mL / OUT: 2999 mL / NET: -239 mL    05 May 2023 07:01  -  06 May 2023 01:44  --------------------------------------------------------  IN: 1450 mL / OUT: 947 mL / NET: 503 mL      PHYSICAL EXAM:  Constitutional: Resting comfortably NAD  Respiratory: +Trach to vent. non-labored breathing. Normal chest rise.   Cardiovascular: Regular rate and rhythm  Gastrointestinal:  +PEG soft, nontender, nondistended  Vascular: Extremities warm, no ulcers, no discoloration of skin.   Neurological: Gen: AA&O x 1 to self. FC, shows 2 fingers b/l, wiggles toes b/l  R gaze, otherwise CN II-XII grossly intact. PERRL, face symmetric  Motor: LUE/LLE 5/5 throughout, RUE 4/5 throughout, RLE 4+/5 throughout  Sensation intact to light touch throughout.  Extremities: warm and well perfused   Wound/incision: SOC incision c/d/i with dressing in place. +EVD.     TUBES/LINES:  [] CVC  [] A-line  [] Lumbar Drain  [x] Ventriculostomy  [] Other    DIET:  [] NPO  [] Mechanical  [x] Tube feeds      LABS:                        9.7    5.41  )-----------( 326      ( 05 May 2023 05:14 )             28.4     05-05    136  |  100  |  11  ----------------------------<  91  3.7   |  24  |  0.44<L>    Ca    9.0      05 May 2023 05:14  Phos  3.5     05-05  Mg     2.0     05-05      PT/INR - ( 04 May 2023 05:05 )   PT: 14.0 sec;   INR: 1.17          PTT - ( 04 May 2023 05:05 )  PTT:36.9 sec        CAPILLARY BLOOD GLUCOSE          Drug Levels: [] N/A    CSF Analysis: [] N/A  RBC Count - Spinal Fluid: 33 /uL (05-05 @ 05:39)  Glucose, CSF: 59 mg/dL (05-05 @ 02:06)  Protein, CSF: 50 mg/dL (05-05 @ 02:06)      Allergies    No Known Allergies    Intolerances      MEDICATIONS:  Antibiotics:  ertapenem  IVPB 1000 milliGRAM(s) IV Intermittent every 24 hours    Neuro:  acetaminophen   Oral Liquid .. 650 milliGRAM(s) Oral every 6 hours PRN  fentaNYL    Injectable 12.5 MICROGram(s) IV Push every 4 hours PRN  oxyCODONE    IR 5 milliGRAM(s) Oral every 4 hours PRN    Anticoagulation:  enoxaparin Injectable 40 milliGRAM(s) SubCutaneous every 24 hours    OTHER:  albuterol/ipratropium for Nebulization 3 milliLiter(s) Nebulizer every 12 hours  atorvastatin 80 milliGRAM(s) Oral at bedtime  carvedilol 3.125 milliGRAM(s) Oral every 12 hours  chlorhexidine 0.12% Liquid 15 milliLiter(s) Oral Mucosa every 12 hours  chlorhexidine 2% Cloths 1 Application(s) Topical <User Schedule>  lactobacillus acidophilus 1 Tablet(s) Oral daily  pantoprazole   Suspension 40 milliGRAM(s) Oral daily  sodium chloride 3%  Inhalation 4 milliLiter(s) Inhalation every 12 hours    IVF:  ascorbic acid 500 milliGRAM(s) Oral <User Schedule>  ferrous    sulfate 325 milliGRAM(s) Oral <User Schedule>  sodium chloride 1 Gram(s) Oral two times a day    CULTURES:  Culture Results:   No growth to date (04-30 @ 06:51)  Culture Results:   >100,000 CFU/ml Escherichia coli ESBL  >100,000 CFU/ml Escherichia coli ESBL Strain #2  Result called to and read back by_ Mr. MYKEL Wright RN  05/02/2023 09:56:41 (04-30 @ 05:11)    RADIOLOGY & ADDITIONAL TESTS:      ASSESSMENT:  53 y/o female transferred from [place] with right sided weakness and right facial numbness. Found to have acute infarction within the right cerebellar hemisphere, causing herniation. Now s/p SOC foramen magnum decompression and right parietal/occipital EVD placement (4/21). Course c/b respiratory insuffiency d/t bulbar dysfunction, s/p trach 4/28/23. s/p PEG 5/2 with GI, s/p dx cerebral angiogram 5/2/23      STROKE    No pertinent family history in first degree relatives    Handoff    Asthma    CAD (coronary artery disease)    Peripheral neuropathy    Cerebellar stroke    Respiratory failure, unspecified with hypoxia    History of DVT (deep vein thrombosis)    Cerebellar stroke    Respiratory failure, unspecified with hypoxia    History of DVT of lower extremity    Cerebellar infarct    CAD (coronary artery disease)    Asthma    Peripheral neuropathy    Lupus anticoagulant positive    Anemia due to acute blood loss    Tracheostomy malfunction    Decompressive craniectomy    Insertion, external ventricular drain    Planned tracheostomy    Tracheostomy tube change    Esophagogastroduodenoscopy (EGD) with anesthesia    Insertion, PEG tube, laparoscopic    Angiogram, carotid and cerebral, bilateral    Tracheostomy tube change    IVC filter placement    S/P total abdominal hysterectomy    S/P cholecystectomy    S/P right knee surgery    [email or web address]      PLAN:  Neuro   - Vitals/neuro q1h   - dx angio 5/2: b/l cavernous ICA aneurysms, plan to be discussed vascular conference  - EVD @10cm H2O, monitor ICP/output   - CTH 4/28 stable; 5/4 new gas in right temporal horn   - Stroke consulted, f/u recs   - Pain control with tylenol prn, oxycodone prn, fent pushes 12.5mg q2hr prn     Cardio  - -160   - s/p IVC filter 5/4  - TTE 4/24: negative for PFO, mild LVH, mild dilation of L atrium, EF 55-60%   - Carvedilol 3.125 BID     Pulm  - VC//40/16/6, CPAP trials as tolerated  - Chest PT, 3% inhalation/duoneb/mucomyst q 8 hrs standing   - 6 cuffed trach placed by ENT 4/28, missing cuff, replaced trach at bedside. --> ENT to remove trach sutures POD7 (5/5)    GI  - TF via PEG (placed 5/2)  - Bowel regimen, last BM 5/5  - Protonix while on vent  - Trend LFTs q 72 hrs   - FOBT 5/4 negative     Renal  - Na goal 135-145, salt tabs 1 q 12  - Voiding via primafit   - IVL    Endo   - cont lipitor     Heme  - SQL   - s/p IVCF 5/4  - LE dopplers 4/22 neg for DVT, but showing superficial venous thrombosis in the proximal portion of the right greater saphenous vein; repeat on 4/29 with persistant superficial thrombus, 5/3 new DVT L IM calf and unchanged R superficial vein thrombus  - Heme following for positive anticardiolipin Ab 4/27, recs appreciated   - Iron and vitamin c every other day    ID  - ID recs: Ertapenem 1g Q24hrs (5/1-5/7) x 7 days  - Last panculture 4/30, MRSA (-), +UA cx ESBL, +sputum cx pluralibacter gergoviae, strep pneumoniae   - +isolation precautions - ESBL in urine   - f/u CSF culture 5/4  - ID cleared for VPS     DISPO:  - NSICU, full code   - PT/OT rec acute inpatient rehab: patient can tolerate 3 hrs PT/OT daily    D/w Dr. Valadez and Dr. Worley   6

## 2023-12-22 NOTE — ED ADULT TRIAGE NOTE - NS ED NURSE BANDS TYPE
Type of Encounter:  Discharge and Unable to Reach (UTR)  Call completed with: N/A    Issues addressed: Transportation and Not Addressed  Progress Notes: Writer attempted to reach pt, however was not able to reach him or leave a message.   Letter sent to him with transportation information.    Resources Provided? Yes   If Yes, what resources were provided? [place]    Action plan for patient: N/A    Action plan for SW: N/A    Social Work goal/plan reviewed: N/A      Next encounter: No future follow-up needed due to closing case        
Name band;
HEADACHE

## 2024-01-03 NOTE — ED ADULT NURSE NOTE - NSFALLRSKUNASSIST_ED_ALL_ED
Please follow the instructions  -stop taking beta-blocker (metoprolol) until you see electrophysiologist  -keep the event monitor on your chest for 30 days  -follow-up with electrophysiologist in 1-2 weeks  -follow up with urologist in 1-2 weeks for right renal mass  -check your INR tomorrow and make sure that INR is between 2-3
no

## 2024-06-12 ENCOUNTER — EMERGENCY (EMERGENCY)
Facility: HOSPITAL | Age: 49
LOS: 1 days | Discharge: HOME | End: 2024-06-12
Admitting: EMERGENCY MEDICINE
Payer: SELF-PAY

## 2024-06-12 VITALS
RESPIRATION RATE: 16 BRPM | SYSTOLIC BLOOD PRESSURE: 135 MMHG | OXYGEN SATURATION: 97 % | TEMPERATURE: 98 F | HEART RATE: 73 BPM | WEIGHT: 177.91 LBS | DIASTOLIC BLOOD PRESSURE: 80 MMHG | HEIGHT: 60.24 IN

## 2024-06-12 DIAGNOSIS — L23.7 ALLERGIC CONTACT DERMATITIS DUE TO PLANTS, EXCEPT FOOD: ICD-10-CM

## 2024-06-12 PROCEDURE — 99284 EMERGENCY DEPT VISIT MOD MDM: CPT

## 2024-06-12 PROCEDURE — 99053 MED SERV 10PM-8AM 24 HR FAC: CPT

## 2024-06-12 RX ORDER — HYDROXYZINE HCL 10 MG
25 TABLET ORAL ONCE
Refills: 0 | Status: COMPLETED | OUTPATIENT
Start: 2024-06-12 | End: 2024-06-12

## 2024-06-12 RX ADMIN — Medication 25 MILLIGRAM(S): at 06:43

## 2024-06-12 RX ADMIN — Medication 60 MILLIGRAM(S): at 06:43

## 2024-06-12 NOTE — ED ADULT TRIAGE NOTE - GLASGOW COMA SCALE: BEST MOTOR RESPONSE, MLM
Repair Performed By Another Provider Text (Leave Blank If You Do Not Want): After obtaining clear surgical margins the defect was repaired by another provider. Deep Sutures: 4-0 Vicryl Cartilage Fenestration Performed?: No Posterior Auricular Interpolation Flap Text: A decision was made to reconstruct the defect utilizing an interpolation axial flap and a staged reconstruction.  A telfa template was made of the defect.  This telfa template was then used to outline the posterior auricular interpolation flap.  The donor area for the pedicle flap was then injected with anesthesia.  The flap was excised through the skin and subcutaneous tissue down to the layer of the underlying musculature.  The pedicle flap was carefully excised within this deep plane to maintain its blood supply.  The edges of the donor site were undermined.   The donor site was closed in a primary fashion.  The pedicle was then rotated into position and sutured.  Once the tube was sutured into place, adequate blood supply was confirmed with blanching and refill.  The pedicle was then wrapped with xeroform gauze and dressed appropriately with a telfa and gauze bandage to ensure continued blood supply and protect the attached pedicle. Complex Repair And Graft Additional Text (Will Appearing After The Standard Complex Repair Text): The complex repair was not sufficient to completely close the primary defect. The remaining additional defect was repaired with the graft mentioned below. H Plasty Text: Given the location of the defect, shape of the defect and the proximity to free margins a H-plasty was deemed most appropriate for repair.  Using a sterile surgical marker, the appropriate advancement arms of the H-plasty were drawn incorporating the defect and placing the expected incisions within the relaxed skin tension lines where possible. The area thus outlined was incised deep to adipose tissue with a #15 scalpel blade. The skin margins were undermined to an appropriate distance in all directions utilizing iris scissors.  The opposing advancement arms were then advanced into place in opposite direction and anchored with interrupted buried subcutaneous sutures. Advancement-Rotation Flap Text: The defect edges were debeveled with a #15 scalpel blade.  Given the location of the defect, shape of the defect and the proximity to free margins an advancement-rotation flap was deemed most appropriate.  Using a sterile surgical marker, an appropriate advancement flap was drawn incorporating the defect and placing the expected incisions within the relaxed skin tension lines where possible.    The area thus outlined was incised deep to adipose tissue with a #15 scalpel blade.  The skin margins were undermined to an appropriate distance in all directions utilizing iris scissors. Detail Level: Detailed Intermediate Repair Preamble Text (Leave Blank If You Do Not Want): Undermining was performed with blunt dissection. Skin Substitute Text: The defect edges were debeveled with a #15 scalpel blade.  Given the location of the defect, shape of the defect and the proximity to free margins a skin substitute graft was deemed most appropriate.  The graft material was trimmed to fit the size of the defect. The graft was then placed in the primary defect and oriented appropriately. Muscle Hinge Flap Text: The defect edges were debeveled with a #15 scalpel blade.  Given the size, depth and location of the defect and the proximity to free margins a muscle hinge flap was deemed most appropriate.  Using a sterile surgical marker, an appropriate hinge flap was drawn incorporating the defect. The area thus outlined was incised with a #15 scalpel blade.  The skin margins were undermined to an appropriate distance in all directions utilizing iris scissors. Purse String (Intermediate) Text: Given the location of the defect and the characteristics of the surrounding skin a pursestring intermediate closure was deemed most appropriate.  Undermining was performed circumfirentially around the surgical defect.  A purstring suture was then placed and tightened. Transposition Flap Text: The defect edges were debeveled with a #15 scalpel blade.  Given the location of the defect and the proximity to free margins a transposition flap was deemed most appropriate.  Using a sterile surgical marker, an appropriate transposition flap was drawn incorporating the defect.    The area thus outlined was incised deep to adipose tissue with a #15 scalpel blade.  The skin margins were undermined to an appropriate distance in all directions utilizing iris scissors. (M6) obeys commands A-T Advancement Flap Text: The defect edges were debeveled with a #15 scalpel blade.  Given the location of the defect, shape of the defect and the proximity to free margins an A-T advancement flap was deemed most appropriate.  Using a sterile surgical marker, an appropriate advancement flap was drawn incorporating the defect and placing the expected incisions within the relaxed skin tension lines where possible.    The area thus outlined was incised deep to adipose tissue with a #15 scalpel blade.  The skin margins were undermined to an appropriate distance in all directions utilizing iris scissors. Keystone Flap Text: The defect edges were debeveled with a #15 scalpel blade.  Given the location of the defect, shape of the defect a keystone flap was deemed most appropriate.  Using a sterile surgical marker, an appropriate keystone flap was drawn incorporating the defect, outlining the appropriate donor tissue and placing the expected incisions within the relaxed skin tension lines where possible. The area thus outlined was incised deep to adipose tissue with a #15 scalpel blade.  The skin margins were undermined to an appropriate distance in all directions around the primary defect and laterally outward around the flap utilizing iris scissors. Tarsorrhaphy Text: A tarsorrhaphy was performed using Frost sutures. Secondary Intention Text (Leave Blank If You Do Not Want): The defect will heal with secondary intention. Non-Graft Cartilage Fenestration Text: The cartilage was fenestrated with a 2mm punch biopsy to help facilitate healing. Tissue Cultured Epidermal Autograft Text: The defect edges were debeveled with a #15 scalpel blade.  Given the location of the defect, shape of the defect and the proximity to free margins a tissue cultured epidermal autograft was deemed most appropriate.  The graft was then trimmed to fit the size of the defect.  The graft was then placed in the primary defect and oriented appropriately. Modified Advancement Flap Text: The defect edges were debeveled with a #15 scalpel blade.  Given the location of the defect, shape of the defect and the proximity to free margins a modified advancement flap was deemed most appropriate.  Using a sterile surgical marker, an appropriate advancement flap was drawn incorporating the defect and placing the expected incisions within the relaxed skin tension lines where possible.    The area thus outlined was incised deep to adipose tissue with a #15 scalpel blade.  The skin margins were undermined to an appropriate distance in all directions utilizing iris scissors. V-Y Flap Text: The defect edges were debeveled with a #15 scalpel blade.  Given the location of the defect, shape of the defect and the proximity to free margins a V-Y flap was deemed most appropriate.  Using a sterile surgical marker, an appropriate advancement flap was drawn incorporating the defect and placing the expected incisions within the relaxed skin tension lines where possible.    The area thus outlined was incised deep to adipose tissue with a #15 scalpel blade.  The skin margins were undermined to an appropriate distance in all directions utilizing iris scissors. Skin Substitute Injection Text: The defect edges were debeveled with a #15 scalpel blade.  Given the location of the defect, shape of the defect and the proximity to free margins a skin substitute micronized graft was deemed most appropriate.  The entire vial contents were admixed with 3.0ccs of sterile saline and then injected subcutaneously throughout the entire wound bed. Cheek-To-Nose Interpolation Flap Text: A decision was made to reconstruct the defect utilizing an interpolation axial flap and a staged reconstruction.  A telfa template was made of the defect.  This telfa template was then used to outline the Cheek-To-Nose Interpolation flap.  The donor area for the pedicle flap was then injected with anesthesia.  The flap was excised through the skin and subcutaneous tissue down to the layer of the underlying musculature.  The interpolation flap was carefully excised within this deep plane to maintain its blood supply.  The edges of the donor site were undermined.   The donor site was closed in a primary fashion.  The pedicle was then rotated into position and sutured.  Once the tube was sutured into place, adequate blood supply was confirmed with blanching and refill.  The pedicle was then wrapped with xeroform gauze and dressed appropriately with a telfa and gauze bandage to ensure continued blood supply and protect the attached pedicle. Full Thickness Lip Wedge Repair (Flap) Text: Given the location of the defect and the proximity to free margins a full thickness wedge repair was deemed most appropriate.  Using a sterile surgical marker, the appropriate repair was drawn incorporating the defect and placing the expected incisions perpendicular to the vermilion border.  The vermilion border was also meticulously outlined to ensure appropriate reapproximation during the repair.  The area thus outlined was incised through and through with a #15 scalpel blade.  The muscularis and dermis were reaproximated with deep sutures following hemostasis. Care was taken to realign the vermilion border before proceeding with the superficial closure.  Once the vermilion was realigned the superfical and mucosal closure was finished. Skin Substitute Units (Will Override Primary Defect Units If Greater Than 0): 0 Partial Purse String (Simple) Text: Given the location of the defect and the characteristics of the surrounding skin a simple purse string closure was deemed most appropriate.  Undermining was performed circumfirentially around the surgical defect.  A purse string suture was then placed and tightened. Wound tension only allowed a partial closure of the circular defect. Mastoid Interpolation Flap Division And Inset Text: Division and inset of the mastoid interpolation flap was performed to achieve optimal aesthetic result, restore normal anatomic appearance and avoid distortion of normal anatomy, expedite and facilitate wound healing, achieve optimal functional result and because linear closure either not possible or would produce suboptimal result. The patient was prepped and draped in the usual manner. The pedicle was infiltrated with local anesthesia. The pedicle was sectioned with a #15 blade. The pedicle was de-bulked and trimmed to match the shape of the defect. Hemostasis was achieved. The flap donor site and free margin of the flap were secured with deep buried sutures and the wound edges were re-approximated. Crescentic Advancement Flap Text: The defect edges were debeveled with a #15 scalpel blade.  Given the location of the defect and the proximity to free margins a crescentic advancement flap was deemed most appropriate.  Using a sterile surgical marker, the appropriate advancement flap was drawn incorporating the defect and placing the expected incisions within the relaxed skin tension lines where possible.    The area thus outlined was incised deep to adipose tissue with a #15 scalpel blade.  The skin margins were undermined to an appropriate distance in all directions utilizing iris scissors. Bilateral Helical Rim Advancement Flap Text: The defect edges were debeveled with a #15 blade scalpel.  Given the location of the defect and the proximity to free margins (helical rim) a bilateral helical rim advancement flap was deemed most appropriate.  Using a sterile surgical marker, the appropriate advancement flaps were drawn incorporating the defect and placing the expected incisions between the helical rim and antihelix where possible.  The area thus outlined was incised through and through with a #15 scalpel blade.  With a skin hook and iris scissors, the flaps were gently and sharply undermined and freed up. Complex Repair And Flap Additional Text (Will Appearing After The Standard Complex Repair Text): The complex repair was not sufficient to completely close the primary defect. The remaining additional defect was repaired with the flap mentioned below. Dressing: pressure dressing Ftsg Text: The defect edges were debeveled with a #15 scalpel blade.  Given the location of the defect, shape of the defect and the proximity to free margins a full thickness skin graft was deemed most appropriate.  Using a sterile surgical marker, the primary defect shape was transferred to the donor site. The area thus outlined was incised deep to adipose tissue with a #15 scalpel blade.  The harvested graft was then trimmed of adipose tissue until only dermis and epidermis was left.  The skin margins of the secondary defect were undermined to an appropriate distance in all directions utilizing iris scissors.  The secondary defect was closed with interrupted buried subcutaneous sutures.  The skin edges were then re-apposed with running  sutures.  The skin graft was then placed in the primary defect and oriented appropriately. Cheiloplasty (Less Than 50%) Text: A decision was made to reconstruct the defect with a  cheiloplasty.  The defect was undermined extensively.  Additional obicularis oris muscle was excised with a 15 blade scalpel.  The defect was converted into a full thickness wedge, of less than 50% of the vertical height of the lip, to facilite a better cosmetic result.  Small vessels were then tied off with 5-0 monocyrl. The obicularis oris, superficial fascia, adipose and dermis were then reapproximated.  After the deeper layers were approximated the epidermis was reapproximated with particular care given to realign the vermilion border. Advancement Flap (Single) Text: The defect edges were debeveled with a #15 scalpel blade.  Given the location of the defect and the proximity to free margins a single advancement flap was deemed most appropriate.  Using a sterile surgical marker, an appropriate advancement flap was drawn incorporating the defect and placing the expected incisions within the relaxed skin tension lines where possible.    The area thus outlined was incised deep to adipose tissue with a #15 scalpel blade.  The skin margins were undermined to an appropriate distance in all directions utilizing iris scissors. Consent: The rationale for Repairs was explained to the patient and consent was obtained. The risks, benefits and alternatives to therapy were discussed in detail. Specifically, the risks of infection, scarring, bleeding, prolonged wound healing, incomplete removal, allergy to anesthesia, nerve injury and recurrence were addressed. Prior to the procedure, the treatment site was clearly identified and confirmed by the patient. All components of Universal Protocol/PAUSE Rule completed. Referred To Mid-Level For Closure Text (Leave Blank If You Do Not Want): After obtaining clear surgical margins the patient was sent to a mid-level provider for surgical repair.  The patient understands they will receive post-surgical care and follow-up from the mid-level provider. W Plasty Text: The lesion was extirpated to the level of the fat with a #15 scalpel blade.  Given the location of the defect, shape of the defect and the proximity to free margins a W-plasty was deemed most appropriate for repair.  Using a sterile surgical marker, the appropriate transposition arms of the W-plasty were drawn incorporating the defect and placing the expected incisions within the relaxed skin tension lines where possible.    The area thus outlined was incised deep to adipose tissue with a #15 scalpel blade.  The skin margins were undermined to an appropriate distance in all directions utilizing iris scissors.  The opposing transposition arms were then transposed into place in opposite direction and anchored with interrupted buried subcutaneous sutures. O-L Flap Text: The defect edges were debeveled with a #15 scalpel blade.  Given the location of the defect, shape of the defect and the proximity to free margins an O-L flap was deemed most appropriate.  Using a sterile surgical marker, an appropriate advancement flap was drawn incorporating the defect and placing the expected incisions within the relaxed skin tension lines where possible.    The area thus outlined was incised deep to adipose tissue with a #15 scalpel blade.  The skin margins were undermined to an appropriate distance in all directions utilizing iris scissors. Alar Island Pedicle Flap Text: The defect edges were debeveled with a #15 scalpel blade.  Given the location of the defect, shape of the defect and the proximity to the alar rim an island pedicle advancement flap was deemed most appropriate.  Using a sterile surgical marker, an appropriate advancement flap was drawn incorporating the defect, outlining the appropriate donor tissue and placing the expected incisions within the nasal ala running parallel to the alar rim. The area thus outlined was incised with a #15 scalpel blade.  The skin margins were undermined minimally to an appropriate distance in all directions around the primary defect and laterally outward around the island pedicle utilizing iris scissors.  There was minimal undermining beneath the pedicle flap. Mercedes Flap Text: The defect edges were debeveled with a #15 scalpel blade.  Given the location of the defect, shape of the defect and the proximity to free margins a Mercedes flap was deemed most appropriate.  Using a sterile surgical marker, an appropriate advancement flap was drawn incorporating the defect and placing the expected incisions within the relaxed skin tension lines where possible. The area thus outlined was incised deep to adipose tissue with a #15 scalpel blade.  The skin margins were undermined to an appropriate distance in all directions utilizing iris scissors. Graft Cartilage Fenestration Text: The cartilage was fenestrated with a 2mm punch biopsy to help facilitate graft survival and healing. Epidermal Sutures: 5-0 Prolene Type Of Previous Surgery (Optional- Ie Mohs Surgery): Mohs Cheek Interpolation Flap Text: A decision was made to reconstruct the defect utilizing an interpolation axial flap and a staged reconstruction.  A telfa template was made of the defect.  This telfa template was then used to outline the Cheek Interpolation flap.  The donor area for the pedicle flap was then injected with anesthesia.  The flap was excised through the skin and subcutaneous tissue down to the layer of the underlying musculature.  The interpolation flap was carefully excised within this deep plane to maintain its blood supply.  The edges of the donor site were undermined.   The donor site was closed in a primary fashion.  The pedicle was then rotated into position and sutured.  Once the tube was sutured into place, adequate blood supply was confirmed with blanching and refill.  The pedicle was then wrapped with xeroform gauze and dressed appropriately with a telfa and gauze bandage to ensure continued blood supply and protect the attached pedicle. Epidermal Autograft Text: The defect edges were debeveled with a #15 scalpel blade.  Given the location of the defect, shape of the defect and the proximity to free margins an epidermal autograft was deemed most appropriate.  Using a sterile surgical marker, the primary defect shape was transferred to the donor site. The epidermal graft was then harvested.  The skin graft was then placed in the primary defect and oriented appropriately. Dermal Autograft Text: The defect edges were debeveled with a #15 scalpel blade.  Given the location of the defect, shape of the defect and the proximity to free margins a dermal autograft was deemed most appropriate.  Using a sterile surgical marker, the primary defect shape was transferred to the donor site. The area thus outlined was incised deep to adipose tissue with a #15 scalpel blade.  The harvested graft was then trimmed of adipose and epidermal tissue until only dermis was left.  The skin graft was then placed in the primary defect and oriented appropriately. Partial Purse String (Intermediate) Text: Given the location of the defect and the characteristics of the surrounding skin an intermediate purse string closure was deemed most appropriate.  Undermining was performed circumfirentially around the surgical defect.  A purse string suture was then placed and tightened. Wound tension only allowed a partial closure of the circular defect. Melolabial Transposition Flap Text: The defect edges were debeveled with a #15 scalpel blade.  Given the location of the defect and the proximity to free margins a melolabial flap was deemed most appropriate.  Using a sterile surgical marker, an appropriate melolabial transposition flap was drawn incorporating the defect.    The area thus outlined was incised deep to adipose tissue with a #15 scalpel blade.  The skin margins were undermined to an appropriate distance in all directions utilizing iris scissors. Wound Care: Bacitracin Banner Transposition Flap Text: The defect edges were debeveled with a #15 scalpel blade.  Given the location of the defect and the proximity to free margins a Banner transposition flap was deemed most appropriate.  Using a sterile surgical marker, an appropriate flap drawn around the defect. The area thus outlined was incised deep to adipose tissue with a #15 scalpel blade.  The skin margins were undermined to an appropriate distance in all directions utilizing iris scissors. Paramedian Forehead Flap Text: A decision was made to reconstruct the defect utilizing an interpolation axial flap and a staged reconstruction.  A telfa template was made of the defect.  This telfa template was then used to outline the paramedian forehead pedicle flap.  The donor area for the pedicle flap was then injected with anesthesia.  The flap was excised through the skin and subcutaneous tissue down to the layer of the underlying musculature.  The pedicle flap was carefully excised within this deep plane to maintain its blood supply.  The edges of the donor site were undermined.   The donor site was closed in a primary fashion.  The pedicle was then rotated into position and sutured.  Once the tube was sutured into place, adequate blood supply was confirmed with blanching and refill.  The pedicle was then wrapped with xeroform gauze and dressed appropriately with a telfa and gauze bandage to ensure continued blood supply and protect the attached pedicle. Skin Substitute Paste Text: The defect edges were debeveled with a #15 scalpel blade.  Given the location of the defect, shape of the defect and the proximity to free margins a skin substitute micronized graft was deemed most appropriate.  The entire vial contents were admixed with 0.5ccs of sterile saline, formed into a paste and then evenly spread over the entire wound bed. Split-Thickness Skin Graft Text: The defect edges were debeveled with a #15 scalpel blade.  Given the location of the defect, shape of the defect and the proximity to free margins a split thickness skin graft was deemed most appropriate.  Using a sterile surgical marker, the primary defect shape was transferred to the donor site. The split thickness graft was then harvested.  The skin graft was then placed in the primary defect and oriented appropriately. Closure 3 Information: This tab is for additional flaps and grafts above and beyond our usual structured repairs.  Please note if you enter information here it will not currently bill and you will need to add the billing information manually. Simple / Intermediate / Complex Repair - Final Wound Length In Cm: 8.6 Show Asc Variables: Yes Post-Care Instructions: I reviewed with the patient in detail post-care instructions. Patient is not to engage in any heavy lifting, exercise, or swimming for the next 14 days. Should the patient develop any fevers, chills, bleeding, severe pain patient will contact the office immediately. Graft Donor Site Bandage (Optional-Leave Blank If You Don't Want In Note): Steri-strips and a pressure bandage were applied to the donor site. Cheek Interpolation Flap Division And Inset Text: Division and inset of the cheek interpolation flap was performed to achieve optimal aesthetic result, restore normal anatomic appearance and avoid distortion of normal anatomy, expedite and facilitate wound healing, achieve optimal functional result and because linear closure either not possible or would produce suboptimal result. The patient was prepped and draped in the usual manner. The pedicle was infiltrated with local anesthesia. The pedicle was sectioned with a #15 blade. The pedicle was de-bulked and trimmed to match the shape of the defect. Hemostasis was achieved. The flap donor site and free margin of the flap were secured with deep buried sutures and the wound edges were re-approximated. Bilobed Flap Text: The defect edges were debeveled with a #15 scalpel blade.  Given the location of the defect and the proximity to free margins a bilobe flap was deemed most appropriate.  Using a sterile surgical marker, an appropriate bilobe flap drawn around the defect.    The area thus outlined was incised deep to adipose tissue with a #15 scalpel blade.  The skin margins were undermined to an appropriate distance in all directions utilizing iris scissors. O-T Plasty Text: The defect edges were debeveled with a #15 scalpel blade.  Given the location of the defect, shape of the defect and the proximity to free margins an O-T plasty was deemed most appropriate.  Using a sterile surgical marker, an appropriate O-T plasty was drawn incorporating the defect and placing the expected incisions within the relaxed skin tension lines where possible.    The area thus outlined was incised deep to adipose tissue with a #15 scalpel blade.  The skin margins were undermined to an appropriate distance in all directions utilizing iris scissors. Primary Defect Width (In Cm): 2 Estimated Blood Loss (Cc): minimal Unna Boot Text: An Unna boot was placed to help immobilize the limb and facilitate more rapid healing. Anesthesia Type: 1% lidocaine with epinephrine Cheiloplasty (Complex) Text: A decision was made to reconstruct the defect with a  cheiloplasty.  The defect was undermined extensively.  Additional obicularis oris muscle was excised with a 15 blade scalpel.  The defect was converted into a full thickness wedge to facilite a better cosmetic result.  Small vessels were then tied off with 5-0 monocyrl. The obicularis oris, superficial fascia, adipose and dermis were then reapproximated.  After the deeper layers were approximated the epidermis was reapproximated with particular care given to realign the vermilion border. Z Plasty Text: The lesion was extirpated to the level of the fat with a #15 scalpel blade.  Given the location of the defect, shape of the defect and the proximity to free margins a Z-plasty was deemed most appropriate for repair.  Using a sterile surgical marker, the appropriate transposition arms of the Z-plasty were drawn incorporating the defect and placing the expected incisions within the relaxed skin tension lines where possible.    The area thus outlined was incised deep to adipose tissue with a #15 scalpel blade.  The skin margins were undermined to an appropriate distance in all directions utilizing iris scissors.  The opposing transposition arms were then transposed into place in opposite direction and anchored with interrupted buried subcutaneous sutures. Mohs Case Number (Optional):  Referred To Otolaryngology For Closure Text (Leave Blank If You Do Not Want): After obtaining clear surgical margins the patient was sent to otolaryngology for surgical repair.  The patient understands they will receive post-surgical care and follow-up from the referring physician's office. Melolabial Interpolation Flap Division And Inset Text: Division and inset of the melolabial interpolation flap was performed to achieve optimal aesthetic result, restore normal anatomic appearance and avoid distortion of normal anatomy, expedite and facilitate wound healing, achieve optimal functional result and because linear closure either not possible or would produce suboptimal result. The patient was prepped and draped in the usual manner. The pedicle was infiltrated with local anesthesia. The pedicle was sectioned with a #15 blade. The pedicle was de-bulked and trimmed to match the shape of the defect. Hemostasis was achieved. The flap donor site and free margin of the flap were secured with deep buried sutures and the wound edges were re-approximated. Island Pedicle Flap With Canthal Suspension Text: The defect edges were debeveled with a #15 scalpel blade.  Given the location of the defect, shape of the defect and the proximity to free margins an island pedicle advancement flap was deemed most appropriate.  Using a sterile surgical marker, an appropriate advancement flap was drawn incorporating the defect, outlining the appropriate donor tissue and placing the expected incisions within the relaxed skin tension lines where possible. The area thus outlined was incised deep to adipose tissue with a #15 scalpel blade.  The skin margins were undermined to an appropriate distance in all directions around the primary defect and laterally outward around the island pedicle utilizing iris scissors.  There was minimal undermining beneath the pedicle flap. A suspension suture was placed in the canthal tendon to prevent tension and prevent ectropion. Lazy S Complex Repair Preamble Text (Leave Blank If You Do Not Want): Extensive wide undermining was performed. Anesthesia Volume In Cc: 4 Epidermal Closure: simple interrupted Composite Graft Text: The defect edges were debeveled with a #15 scalpel blade.  Given the location of the defect, shape of the defect, the proximity to free margins and the fact the defect was full thickness a composite graft was deemed most appropriate.  The defect was outline and then transferred to the donor site.  A full thickness graft was then excised from the donor site. The graft was then placed in the primary defect, oriented appropriately and then sutured into place.  The secondary defect was then repaired using a primary closure. Advancement Flap (Double) Text: The defect edges were debeveled with a #15 scalpel blade.  Given the location of the defect and the proximity to free margins a double advancement flap was deemed most appropriate.  Using a sterile surgical marker, the appropriate advancement flaps were drawn incorporating the defect and placing the expected incisions within the relaxed skin tension lines where possible.    The area thus outlined was incised deep to adipose tissue with a #15 scalpel blade.  The skin margins were undermined to an appropriate distance in all directions utilizing iris scissors. Manual Repair Warning Statement: We plan on removing the manually selected variable below in favor of our much easier automatic structured text blocks found in the previous tab. We decided to do this to help make the flow better and give you the full power of structured data. Manual selection is never going to be ideal in our platform and I would encourage you to avoid using manual selection from this point on, especially since I will be sunsetting this feature. It is important that you do one of two things with the customized text below. First, you can save all of the text in a word file so you can have it for future reference. Second, transfer the text to the appropriate area in the Library tab. Lastly, if there is a flap or graft type which we do not have you need to let us know right away so I can add it in before the variable is hidden. No need to panic, we plan to give you roughly 6 months to make the change. Posterior Auricular Interpolation Flap Division And Inset Text: Division and inset of the posterior auricular interpolation flap was performed to achieve optimal aesthetic result, restore normal anatomic appearance and avoid distortion of normal anatomy, expedite and facilitate wound healing, achieve optimal functional result and because linear closure either not possible or would produce suboptimal result. The patient was prepped and draped in the usual manner. The pedicle was infiltrated with local anesthesia. The pedicle was sectioned with a #15 blade. The pedicle was de-bulked and trimmed to match the shape of the defect. Hemostasis was achieved. The flap donor site and free margin of the flap were secured with deep buried sutures and the wound edges were re-approximated. Cheek To Nose Interpolation Flap Division And Inset Text: Division and inset of the cheek to nose interpolation flap was performed to achieve optimal aesthetic result, restore normal anatomic appearance and avoid distortion of normal anatomy, expedite and facilitate wound healing, achieve optimal functional result and because linear closure either not possible or would produce suboptimal result. The patient was prepped and draped in the usual manner. The pedicle was infiltrated with local anesthesia. The pedicle was sectioned with a #15 blade. The pedicle was de-bulked and trimmed to match the shape of the defect. Hemostasis was achieved. The flap donor site and free margin of the flap were secured with deep buried sutures and the wound edges were re-approximated. Rhombic Flap Text: The defect edges were debeveled with a #15 scalpel blade.  Given the location of the defect and the proximity to free margins a rhombic flap was deemed most appropriate.  Using a sterile surgical marker, an appropriate rhombic flap was drawn incorporating the defect.    The area thus outlined was incised deep to adipose tissue with a #15 scalpel blade.  The skin margins were undermined to an appropriate distance in all directions utilizing iris scissors. Localized Dermabrasion Text: The patient was draped in routine manner.  Localized dermabrasion using 3 x 17 mm wire brush was performed in routine manner to papillary dermis. This spot dermabrasion is being performed to complete skin cancer reconstruction. It also will eliminate the other sun damaged precancerous cells that are known to be part of the regional effect of a lifetime's worth of sun exposure. This localized dermabrasion is therapeutic and should not be considered cosmetic in any regard. Referred To Oculoplastics For Closure Text (Leave Blank If You Do Not Want): After obtaining clear surgical margins the patient was sent to oculoplastics for surgical repair.  The patient understands they will receive post-surgical care and follow-up from the referring physician's office. V-Y Plasty Text: The defect edges were debeveled with a #15 scalpel blade.  Given the location of the defect, shape of the defect and the proximity to free margins an V-Y advancement flap was deemed most appropriate.  Using a sterile surgical marker, an appropriate advancement flap was drawn incorporating the defect and placing the expected incisions within the relaxed skin tension lines where possible.    The area thus outlined was incised deep to adipose tissue with a #15 scalpel blade.  The skin margins were undermined to an appropriate distance in all directions utilizing iris scissors. Referred To Plastics For Closure Text (Leave Blank If You Do Not Want): After obtaining clear surgical margins the patient was sent to plastics for surgical repair.  The patient understands they will receive post-surgical care and follow-up from the referring physician's office. Ear Star Wedge Flap Text: The defect edges were debeveled with a #15 blade scalpel.  Given the location of the defect and the proximity to free margins (helical rim) an ear star wedge flap was deemed most appropriate.  Using a sterile surgical marker, the appropriate flap was drawn incorporating the defect and placing the expected incisions between the helical rim and antihelix where possible.  The area thus outlined was incised through and through with a #15 scalpel blade. Closure 2 Information: This tab is for additional flaps and grafts, including complex repair and grafts and complex repair and flaps. You can also specify a different location for the additional defect, if the location is the same you do not need to select a new one. We will insert the automated text for the repair you select below just as we do for solitary flaps and grafts. Please note that at this time if you select a location with a different insurance zone you will need to override the ICD10 and CPT if appropriate. Spiral Flap Text: The defect edges were debeveled with a #15 scalpel blade.  Given the location of the defect, shape of the defect and the proximity to free margins a spiral flap was deemed most appropriate.  Using a sterile surgical marker, an appropriate rotation flap was drawn incorporating the defect and placing the expected incisions within the relaxed skin tension lines where possible. The area thus outlined was incised deep to adipose tissue with a #15 scalpel blade.  The skin margins were undermined to an appropriate distance in all directions utilizing iris scissors. Repair Type: Complex Repair O-T Advancement Flap Text: The defect edges were debeveled with a #15 scalpel blade.  Given the location of the defect, shape of the defect and the proximity to free margins an O-T advancement flap was deemed most appropriate.  Using a sterile surgical marker, an appropriate advancement flap was drawn incorporating the defect and placing the expected incisions within the relaxed skin tension lines where possible.    The area thus outlined was incised deep to adipose tissue with a #15 scalpel blade.  The skin margins were undermined to an appropriate distance in all directions utilizing iris scissors. Rotation Flap Text: The defect edges were debeveled with a #15 scalpel blade.  Given the location of the defect, shape of the defect and the proximity to free margins a rotation flap was deemed most appropriate.  Using a sterile surgical marker, an appropriate rotation flap was drawn incorporating the defect and placing the expected incisions within the relaxed skin tension lines where possible.    The area thus outlined was incised deep to adipose tissue with a #15 scalpel blade.  The skin margins were undermined to an appropriate distance in all directions utilizing iris scissors. Skin Substitute: EpiFix Micronized Island Pedicle Flap Text: The defect edges were debeveled with a #15 scalpel blade.  Given the location of the defect, shape of the defect and the proximity to free margins an island pedicle advancement flap was deemed most appropriate.  Using a sterile surgical marker, an appropriate advancement flap was drawn incorporating the defect, outlining the appropriate donor tissue and placing the expected incisions within the relaxed skin tension lines where possible.    The area thus outlined was incised deep to adipose tissue with a #15 scalpel blade.  The skin margins were undermined to an appropriate distance in all directions around the primary defect and laterally outward around the island pedicle utilizing iris scissors.  There was minimal undermining beneath the pedicle flap. Island Pedicle Flap-Requiring Vessel Identification Text: The defect edges were debeveled with a #15 scalpel blade.  Given the location of the defect, shape of the defect and the proximity to free margins an island pedicle advancement flap was deemed most appropriate.  Using a sterile surgical marker, an appropriate advancement flap was drawn, based on the axial vessel mentioned above, incorporating the defect, outlining the appropriate donor tissue and placing the expected incisions within the relaxed skin tension lines where possible.    The area thus outlined was incised deep to adipose tissue with a #15 scalpel blade.  The skin margins were undermined to an appropriate distance in all directions around the primary defect and laterally outward around the island pedicle utilizing iris scissors.  There was minimal undermining beneath the pedicle flap. O-Z Plasty Text: The defect edges were debeveled with a #15 scalpel blade.  Given the location of the defect, shape of the defect and the proximity to free margins an O-Z plasty (double transposition flap) was deemed most appropriate.  Using a sterile surgical marker, the appropriate transposition flaps were drawn incorporating the defect and placing the expected incisions within the relaxed skin tension lines where possible.    The area thus outlined was incised deep to adipose tissue with a #15 scalpel blade.  The skin margins were undermined to an appropriate distance in all directions utilizing iris scissors.  Hemostasis was achieved with electrocautery.  The flaps were then transposed into place, one clockwise and the other counterclockwise, and anchored with interrupted buried subcutaneous sutures. Primary Defect Length (In Cm): 3.2 Suture Removal: 14 days Trilobed Flap Text: The defect edges were debeveled with a #15 scalpel blade.  Given the location of the defect and the proximity to free margins a trilobed flap was deemed most appropriate.  Using a sterile surgical marker, an appropriate trilobed flap drawn around the defect.    The area thus outlined was incised deep to adipose tissue with a #15 scalpel blade.  The skin margins were undermined to an appropriate distance in all directions utilizing iris scissors. Star Wedge Flap Text: The defect edges were debeveled with a #15 scalpel blade.  Given the location of the defect, shape of the defect and the proximity to free margins a star wedge flap was deemed most appropriate.  Using a sterile surgical marker, an appropriate rotation flap was drawn incorporating the defect and placing the expected incisions within the relaxed skin tension lines where possible. The area thus outlined was incised deep to adipose tissue with a #15 scalpel blade.  The skin margins were undermined to an appropriate distance in all directions utilizing iris scissors. Melolabial Interpolation Flap Text: A decision was made to reconstruct the defect utilizing an interpolation axial flap and a staged reconstruction.  A telfa template was made of the defect.  This telfa template was then used to outline the melolabial interpolation flap.  The donor area for the pedicle flap was then injected with anesthesia.  The flap was excised through the skin and subcutaneous tissue down to the layer of the underlying musculature.  The pedicle flap was carefully excised within this deep plane to maintain its blood supply.  The edges of the donor site were undermined.   The donor site was closed in a primary fashion.  The pedicle was then rotated into position and sutured.  Once the tube was sutured into place, adequate blood supply was confirmed with blanching and refill.  The pedicle was then wrapped with xeroform gauze and dressed appropriately with a telfa and gauze bandage to ensure continued blood supply and protect the attached pedicle. Ear Wedge Repair Text: A wedge excision was completed by carrying down an excision through the full thickness of the ear and cartilage with an inward facing Burow's triangle. The wound was then closed in a layered fashion. X Size Of Lesion In Cm (Optional): 1.8 Closure 4 Information: This tab is for additional flaps and grafts above and beyond our usual structured repairs.  Please note if you enter information here it will not currently bill and you will need to add the billing information manually. Helical Rim Advancement Flap Text: The defect edges were debeveled with a #15 blade scalpel.  Given the location of the defect and the proximity to free margins (helical rim) a double helical rim advancement flap was deemed most appropriate.  Using a sterile surgical marker, the appropriate advancement flaps were drawn incorporating the defect and placing the expected incisions between the helical rim and antihelix where possible.  The area thus outlined was incised through and through with a #15 scalpel blade.  With a skin hook and iris scissors, the flaps were gently and sharply undermined and freed up. Bilobed Transposition Flap Text: The defect edges were debeveled with a #15 scalpel blade.  Given the location of the defect and the proximity to free margins a bilobed transposition flap was deemed most appropriate.  Using a sterile surgical marker, an appropriate bilobe flap drawn around the defect.    The area thus outlined was incised deep to adipose tissue with a #15 scalpel blade.  The skin margins were undermined to an appropriate distance in all directions utilizing iris scissors. Double Island Pedicle Flap Text: The defect edges were debeveled with a #15 scalpel blade.  Given the location of the defect, shape of the defect and the proximity to free margins a double island pedicle advancement flap was deemed most appropriate.  Using a sterile surgical marker, an appropriate advancement flap was drawn incorporating the defect, outlining the appropriate donor tissue and placing the expected incisions within the relaxed skin tension lines where possible.    The area thus outlined was incised deep to adipose tissue with a #15 scalpel blade.  The skin margins were undermined to an appropriate distance in all directions around the primary defect and laterally outward around the island pedicle utilizing iris scissors.  There was minimal undermining beneath the pedicle flap. Paramedian Forehead Flap Division And Inset Text: Division and inset of the paramedian forehead flap was performed to achieve optimal aesthetic result, restore normal anatomic appearance and avoid distortion of normal anatomy, expedite and facilitate wound healing, achieve optimal functional result and because linear closure either not possible or would produce suboptimal result. The patient was prepped and draped in the usual manner. The pedicle was infiltrated with local anesthesia. The pedicle was sectioned with a #15 blade. The pedicle was de-bulked and trimmed to match the shape of the defect. Hemostasis was achieved. The flap donor site and free margin of the flap were secured with deep buried sutures and the wound edges were re-approximated. No Repair - Repaired With Adjacent Surgical Defect Text (Leave Blank If You Do Not Want): After obtaining clear surgical margins the defect was repaired concurrently with another surgical defect which was in close approximation. Dorsal Nasal Flap Text: The defect edges were debeveled with a #15 scalpel blade.  Given the location of the defect and the proximity to free margins a dorsal nasal flap was deemed most appropriate.  Using a sterile surgical marker, an appropriate dorsal nasal flap was drawn around the defect.    The area thus outlined was incised deep to adipose tissue with a #15 scalpel blade.  The skin margins were undermined to an appropriate distance in all directions utilizing iris scissors. Burow's Advancement Flap Text: The defect edges were debeveled with a #15 scalpel blade.  Given the location of the defect and the proximity to free margins a Burow's advancement flap was deemed most appropriate.  Using a sterile surgical marker, the appropriate advancement flap was drawn incorporating the defect and placing the expected incisions within the relaxed skin tension lines where possible.    The area thus outlined was incised deep to adipose tissue with a #15 scalpel blade.  The skin margins were undermined to an appropriate distance in all directions utilizing iris scissors. Bi-Rhombic Flap Text: The defect edges were debeveled with a #15 scalpel blade.  Given the location of the defect and the proximity to free margins a bi-rhombic flap was deemed most appropriate.  Using a sterile surgical marker, an appropriate rhombic flap was drawn incorporating the defect. The area thus outlined was incised deep to adipose tissue with a #15 scalpel blade.  The skin margins were undermined to an appropriate distance in all directions utilizing iris scissors. Hemostasis: Electrocautery Cartilage Graft Text: The defect edges were debeveled with a #15 scalpel blade.  Given the location of the defect, shape of the defect, the fact the defect involved a full thickness cartilage defect a cartilage graft was deemed most appropriate.  An appropriate donor site was identified, cleansed, and anesthetized. The cartilage graft was then harvested and transferred to the recipient site, oriented appropriately and then sutured into place.  The secondary defect was then repaired using a primary closure. Mastoid Interpolation Flap Text: A decision was made to reconstruct the defect utilizing an interpolation axial flap and a staged reconstruction.  A telfa template was made of the defect.  This telfa template was then used to outline the mastoid interpolation flap.  The donor area for the pedicle flap was then injected with anesthesia.  The flap was excised through the skin and subcutaneous tissue down to the layer of the underlying musculature.  The pedicle flap was carefully excised within this deep plane to maintain its blood supply.  The edges of the donor site were undermined.   The donor site was closed in a primary fashion.  The pedicle was then rotated into position and sutured.  Once the tube was sutured into place, adequate blood supply was confirmed with blanching and refill.  The pedicle was then wrapped with xeroform gauze and dressed appropriately with a telfa and gauze bandage to ensure continued blood supply and protect the attached pedicle. Pre-Op Size Of Lesion Removed (Optional): 2.8 Interpolation Flap Text: A decision was made to reconstruct the defect utilizing an interpolation axial flap and a staged reconstruction.  A telfa template was made of the defect.  This telfa template was then used to outline the interpolation flap.  The donor area for the pedicle flap was then injected with anesthesia.  The flap was excised through the skin and subcutaneous tissue down to the layer of the underlying musculature.  The interpolation flap was carefully excised within this deep plane to maintain its blood supply.  The edges of the donor site were undermined.   The donor site was closed in a primary fashion.  The pedicle was then rotated into position and sutured.  Once the tube was sutured into place, adequate blood supply was confirmed with blanching and refill.  The pedicle was then wrapped with xeroform gauze and dressed appropriately with a telfa and gauze bandage to ensure continued blood supply and protect the attached pedicle. Purse String (Simple) Text: Given the location of the defect and the characteristics of the surrounding skin a pursestring closure was deemed most appropriate.  Undermining was performed circumfirentially around the surgical defect.  A purstring suture was then placed and tightened. Hatchet Flap Text: The defect edges were debeveled with a #15 scalpel blade.  Given the location of the defect, shape of the defect and the proximity to free margins a hatchet flap was deemed most appropriate.  Using a sterile surgical marker, an appropriate hatchet flap was drawn incorporating the defect and placing the expected incisions within the relaxed skin tension lines where possible.    The area thus outlined was incised deep to adipose tissue with a #15 scalpel blade.  The skin margins were undermined to an appropriate distance in all directions utilizing iris scissors. Xenograft Text: The defect edges were debeveled with a #15 scalpel blade.  Given the location of the defect, shape of the defect and the proximity to free margins a xenograft was deemed most appropriate.  The graft was then trimmed to fit the size of the defect.  The graft was then placed in the primary defect and oriented appropriately. S Plasty Text: Given the location and shape of the defect, and the orientation of relaxed skin tension lines, an S-plasty was deemed most appropriate for repair.  Using a sterile surgical marker, the appropriate outline of the S-plasty was drawn, incorporating the defect and placing the expected incisions within the relaxed skin tension lines where possible.  The area thus outlined was incised deep to adipose tissue with a #15 scalpel blade.  The skin margins were undermined to an appropriate distance in all directions utilizing iris scissors. The skin flaps were advanced over the defect.  The opposing margins were then approximated with interrupted buried subcutaneous sutures. Mucosal Advancement Flap Text: Given the location of the defect, shape of the defect and the proximity to free margins a mucosal advancement flap was deemed most appropriate. Incisions were made with a 15 blade scalpel in the appropriate fashion along the cutaneous vermilion border and the mucosal lip. The remaining actinically damaged mucosal tissue was excised.  The mucosal advancement flap was then elevated to the gingival sulcus with care taken to preserve the neurovascular structures and advanced into the primary defect. Care was taken to ensure that precise realignment of the vermilion border was achieved. Referred To Asc For Closure Text (Leave Blank If You Do Not Want): After obtaining clear surgical margins the patient was sent to an ASC for surgical repair.  The patient understands they will receive post-surgical care and follow-up from the ASC physician.

## 2024-06-12 NOTE — ED ADULT NURSE NOTE - CAS TRG GEN SKIN CONDITION
Spray Paint Technique: No Post-Care Instructions: I reviewed with the patient in detail post-care instructions. Patient is to wear sunprotection, and avoid picking at any of the treated lesions. Pt may apply Vaseline to crusted or scabbing areas. Show Spray Paint Technique Variable?: Yes Detail Level: Simple Duration Of Freeze Thaw-Cycle (Seconds): 5 Total Number Of Lesions Treated: 2 Medical Necessity Information: It is in your best interest to select a reason for this procedure from the list below. All of these items fulfill various CMS LCD requirements except the new and changing color options. Number Of Freeze-Thaw Cycles: 2 freeze-thaw cycles Spray Paint Text: The liquid nitrogen was applied to the skin utilizing a spray paint frosting technique. Consent: The patient's consent was obtained including but not limited to risks of crusting, scabbing, blistering, scarring, darker or lighter pigmentary change, recurrence, incomplete removal and infection. Medical Necessity Clause: This procedure was medically necessary because the lesions that were treated were: Warm

## 2024-06-12 NOTE — ED ADULT NURSE NOTE - CHIEF COMPLAINT
The patient is a 29y Male complaining of itching. Island Pedicle Flap-Requiring Vessel Identification Text: The defect edges were debeveled with a #15 scalpel blade.  Given the location of the defect, shape of the defect and the proximity to free margins an island pedicle advancement flap was deemed most appropriate.  Using a sterile surgical marker, an appropriate advancement flap was drawn, based on the axial vessel mentioned above, incorporating the defect, outlining the appropriate donor tissue and placing the expected incisions within the relaxed skin tension lines where possible.    The area thus outlined was incised deep to adipose tissue with a #15 scalpel blade.  The skin margins were undermined to an appropriate distance in all directions around the primary defect and laterally outward around the island pedicle utilizing iris scissors.  There was minimal undermining beneath the pedicle flap.

## 2024-06-12 NOTE — ED PROVIDER NOTE - CLINICAL SUMMARY MEDICAL DECISION MAKING FREE TEXT BOX
30 yo m well appearing was hanging out with his friend and in the woods he was exposed to poison ivy developed severe pruritis with rash on the L side of the face and b/l leg and hands after Poison Ivy exposure. As per pt friend did not warn him of poison ivy and he walked into a patch by mistake. on exam pt has contact dermatitis and mild L sided facial edema around the L orbit and face

## 2024-06-12 NOTE — ED ADULT TRIAGE NOTE - CHIEF COMPLAINT QUOTE
pt c/o of exposure to poison ivy. pt c/o of itchiness on bilateral arms and swelling to the left eye

## 2024-06-12 NOTE — ED PROVIDER NOTE - PHYSICAL EXAMINATION
Physical Exam    Vital Signs: I have reviewed the initial vital signs.  Constitutional: well-appearing, appears stated age  Eyes: PERRLA, EOM intact, RAPD absent, and symmetrical lids.  ENT: Neck supple with no adenopathy, moist MM, mild L sided facial edema around the orbit and cheek  Cardiovascular: regular rate, regular rhythm, well-perfused extremities  Respiratory: unlabored respiratory effort, clear to auscultation bilaterally  Gastrointestinal: soft, non-tender abdomen, no pulsatile mass  Musculoskeletal: supple neck, no lower extremity edema  Integumentary: warm, dry, +b/l contact dermatitis on the UE and LE   Neurologic: awake, alert, cranial nerves II-XII grossly intact, extremities’ motor and sensory functions grossly intact

## 2024-06-12 NOTE — ED PROVIDER NOTE - NS ED ROS FT
Review of Systems    Constitutional: (-) fever  Cardiovascular: (-) chest pain, (-) palpitation   Respiratory: (-) cough, (-) shortness of breath  Gastrointestinal: (-) abdominal pain (-) vomiting, (-) diarrhea  Musculoskeletal: (-) neck pain, (-) back pain, (-) joint pain  Integumentary: (-) rash, (-) edema  Neurological: (-) headache, (-) altered mental status  Heme/Lymph: (-) easy bruising (-) easy bleeding

## 2024-06-12 NOTE — ED ADULT NURSE NOTE - OBJECTIVE STATEMENT
pt reports bilateral arm itching and left eye swelling after exposure to poison ivy; hx same exposure & symptoms approx. 4 years ago; pt denies chest pain, SOB, nausea, fever, vision changes; pt alert & oriented x4, in no acute distress

## 2024-06-12 NOTE — ED ADULT NURSE NOTE - PAIN RATING/NUMBER SCALE (0-10): ACTIVITY
Patient presents to ED with c/o fluttering in his chest since 0500, awoke him from sleep, patient was also diaphoretic, describes chest discomfort not pain.  
0 (no pain/absence of nonverbal indicators of pain)

## 2024-06-12 NOTE — ED PROVIDER NOTE - PATIENT PORTAL LINK FT
You can access the FollowMyHealth Patient Portal offered by [facility] by registering at the following website: [email or web address]. By joining [employer]’s FollowMyHealth portal, you will also be able to view your health information using other applications (apps) compatible with our system.

## 2024-06-12 NOTE — ED PROVIDER NOTE - OBJECTIVE STATEMENT
28 yo m well appearing was hanging out with his friend and in the woods he was exposed to poison ivy developed severe pruritis with rash on the L side of the face and b/l leg and hands after Poison Ivy exposure. As per pt friend did not warn him of poison ivy and he walked into a patch by mistake.    I have reviewed available current nursing and previous documentation of past medical, surgical, family, and/or social history.

## 2024-06-12 NOTE — ED PROVIDER NOTE - NSFOLLOWUPINSTRUCTIONS_ED_ALL_ED_FT
Please note that you already took 1 dose of 60 mg of Prednisone today please follow the following medication josé luis --  PREDNISONE 60 MG FOR 4 DAYS  PREDNISONE 40 MG FOR 5 DAYS  PREDNISONE 20 MG FOR 5 DAYS  PREDNISONE 10 MG FOR 5 DAYS    Poison Ivy    WHAT YOU NEED TO KNOW:    Poison ivy is a plant that can cause an itchy, uncomfortable rash on your skin. Poison ivy grows as a shrub or vine in woods, fields, and areas of thick underbrush. It has 3 bright green leaves on each stem that turn red in emily.     DISCHARGE INSTRUCTIONS:      Medicines:     Antiseptic or drying creams or ointments: These medicines may be used to dry out the rash and decrease the itching. These products may be available without a doctor's order.     Steroids: This medicine helps decrease itching and inflammation. It can be given as a cream to apply to your skin or as a pill.     Antihistamines: This medicine may help decrease itching and help you sleep. It is available without a doctor's order.     Take your medicine as directed. Contact your healthcare provider if you think your medicine is not helping or if you have side effects. Tell him of her if you are allergic to any medicine. Keep a list of the medicines, vitamins, and herbs you take. Include the amounts, and when and why you take them. Bring the list or the pill bottles to follow-up visits. Carry your medicine list with you in case of an emergency.    Follow up with your healthcare provider as directed: Write down your questions so you remember to ask them during your visits.     How your poison ivy rash spreads: You cannot spread poison ivy by touching your rash or the liquid from your blisters. Poison ivy is spread only if you scratch your skin while it still has oil on it. You may think your rash is spreading because new rashes appear over a number of days. This happens because areas covered by thin skin break out in a rash first. Your face or forearms may develop a rash before thicker areas, such as the palms of your hands.       Self-care:     Keep your rash clean and dry: Wash it with soap and water. Gently pat it dry with a clean towel.    Try not to scratch or rub your rash: This can cause your skin to become infected.    Use a compress on your rash: Dip a clean washcloth in cool water. Wring it out and place it on your rash. Leave the washcloth on your skin for 15 minutes. Do this at least 3 times per day.     Take a cornstarch or oatmeal bath: If your rash is too large to cover with wet washcloths, take 3 or 4 cornstarch baths daily. Mix 1 pound of cornstarch with a little water to make a paste. Add the paste to a tub full of water and mix well. You may also use colloidal oatmeal in the bath water. Use lukewarm water. Avoid hot water because it may cause your itching to increase.      Prevent a poison ivy rash in the future:     Wear skin protection: Wear long pants, a long-sleeved shirt, and gloves. Use a skin block lotion to protect your skin from poison ivy oil. You can find this at a drugstore without a prescription.    Wash clothing after possible exposure: If you think you have been near a poison ivy plant, wash the clothes you were wearing separately from other clothes. Rinse the washing machine well after you take the clothes out. Scrub boots and shoes with warm, soapy water. Dry clean items and clothing that you cannot wash in water. Poison ivy oil is sticky and can stay on surfaces for a long time. It can cause a new rash even years later.     Bathe your pet: Use warm water and shampoo on your pet's fur. This will prevent the spread of oil to your skin, car, and home. Wear long sleeves, long pants, and gloves while washing pets or any items that may have oil on them.    Reduce exposure to poison ivy: Do not touch plants that look like poison ivy. Keep your yard free of poison ivy. While protecting your skin, remove the plant and the roots. Place them in a plastic bag and seal the bag tightly.     Do not burn poison ivy plants: This can spread the oil through the air. If you breathe the oil into your lungs, you could have swelling and serious breathing problems. Oil that clings to the fire austen can land on your skin and cause a rash.      Contact your healthcare provider if:     You have pus, soft yellow scabs, or tenderness on the rash.  The itching gets worse or keeps you awake at night.  The rash covers more than 1/4 of your skin or spreads to your eyes, mouth, or genital area.   The rash is not better after 2 to 3 weeks.  You have tender, swollen glands on the sides of your neck.  You have swelling in your arms and legs.  You have questions or concerns about your condition or care.      Return to the emergency department if:     You have a fever.  You have redness, swelling, and tenderness around the rash.  You have trouble breathing.

## 2024-06-23 ENCOUNTER — EMERGENCY (EMERGENCY)
Facility: HOSPITAL | Age: 49
LOS: 1 days | Discharge: HOME | End: 2024-06-23
Admitting: EMERGENCY MEDICINE
Payer: SELF-PAY

## 2024-06-23 VITALS
OXYGEN SATURATION: 98 % | HEART RATE: 80 BPM | HEIGHT: 60.24 IN | TEMPERATURE: 98 F | DIASTOLIC BLOOD PRESSURE: 89 MMHG | SYSTOLIC BLOOD PRESSURE: 136 MMHG | RESPIRATION RATE: 16 BRPM

## 2024-06-23 VITALS
HEART RATE: 66 BPM | RESPIRATION RATE: 16 BRPM | DIASTOLIC BLOOD PRESSURE: 67 MMHG | TEMPERATURE: 98 F | OXYGEN SATURATION: 97 % | SYSTOLIC BLOOD PRESSURE: 121 MMHG

## 2024-06-23 PROCEDURE — 99284 EMERGENCY DEPT VISIT MOD MDM: CPT

## 2024-06-23 RX ORDER — PERMETHRIN CREAM 5% W/W 50 MG/G
1 CREAM TOPICAL
Qty: 1 | Refills: 0
Start: 2024-06-23 | End: 2024-06-23

## 2024-06-23 RX ORDER — DIPHENHYDRAMINE HCL 50 MG
1 CAPSULE ORAL
Qty: 42 | Refills: 0
Start: 2024-06-23 | End: 2024-07-06

## 2024-06-23 RX ADMIN — Medication 50 MILLIGRAM(S): at 17:32

## 2024-06-23 NOTE — ED PROVIDER NOTE - NSFOLLOWUPINSTRUCTIONS_ED_ALL_ED_FT
POISON IVY - AfterCare(R) Instructions(ER/ED)     Poison Ivy    WHAT YOU NEED TO KNOW:    Poison ivy is a plant that can cause an itchy, uncomfortable rash on your skin. Poison ivy grows as a shrub or vine in woods, fields, and areas of thick underbrush. It has 3 bright green leaves on each stem that turn red in emily.     DISCHARGE INSTRUCTIONS:    Medicines:     Antiseptic or drying creams or ointments: These medicines may be used to dry out the rash and decrease the itching. These products may be available without a doctor's order.       Steroids: This medicine helps decrease itching and inflammation. It can be given as a cream to apply to your skin or as a pill.       Antihistamines: This medicine may help decrease itching and help you sleep. It is available without a doctor's order.       Take your medicine as directed. Contact your healthcare provider if you think your medicine is not helping or if you have side effects. Tell him of her if you are allergic to any medicine. Keep a list of the medicines, vitamins, and herbs you take. Include the amounts, and when and why you take them. Bring the list or the pill bottles to follow-up visits. Carry your medicine list with you in case of an emergency.    Follow up with your healthcare provider as directed: Write down your questions so you remember to ask them during your visits.     How your poison ivy rash spreads: You cannot spread poison ivy by touching your rash or the liquid from your blisters. Poison ivy is spread only if you scratch your skin while it still has oil on it. You may think your rash is spreading because new rashes appear over a number of days. This happens because areas covered by thin skin break out in a rash first. Your face or forearms may develop a rash before thicker areas, such as the palms of your hands.     Self-care:     Keep your rash clean and dry: Wash it with soap and water. Gently pat it dry with a clean towel.      Try not to scratch or rub your rash: This can cause your skin to become infected.      Use a compress on your rash: Dip a clean washcloth in cool water. Wring it out and place it on your rash. Leave the washcloth on your skin for 15 minutes. Do this at least 3 times per day.       Take a cornstarch or oatmeal bath: If your rash is too large to cover with wet washcloths, take 3 or 4 cornstarch baths daily. Mix 1 pound of cornstarch with a little water to make a paste. Add the paste to a tub full of water and mix well. You may also use colloidal oatmeal in the bath water. Use lukewarm water. Avoid hot water because it may cause your itching to increase.    Prevent a poison ivy rash in the future:     Wear skin protection: Wear long pants, a long-sleeved shirt, and gloves. Use a skin block lotion to protect your skin from poison ivy oil. You can find this at a drugstore without a prescription.      Wash clothing after possible exposure: If you think you have been near a poison ivy plant, wash the clothes you were wearing separately from other clothes. Rinse the washing machine well after you take the clothes out. Scrub boots and shoes with warm, soapy water. Dry clean items and clothing that you cannot wash in water. Poison ivy oil is sticky and can stay on surfaces for a long time. It can cause a new rash even years later.       Bathe your pet: Use warm water and shampoo on your pet's fur. This will prevent the spread of oil to your skin, car, and home. Wear long sleeves, long pants, and gloves while washing pets or any items that may have oil on them.      Reduce exposure to poison ivy: Do not touch plants that look like poison ivy. Keep your yard free of poison ivy. While protecting your skin, remove the plant and the roots. Place them in a plastic bag and seal the bag tightly.       Do not burn poison ivy plants: This can spread the oil through the air. If you breathe the oil into your lungs, you could have swelling and serious breathing problems. Oil that clings to the fire austen can land on your skin and cause a rash.    Contact your healthcare provider if:     You have pus, soft yellow scabs, or tenderness on the rash.      The itching gets worse or keeps you awake at night.      The rash covers more than 1/4 of your skin or spreads to your eyes, mouth, or genital area.       The rash is not better after 2 to 3 weeks.      You have tender, swollen glands on the sides of your neck.      You have swelling in your arms and legs.      You have questions or concerns about your condition or care.    Return to the emergency department if:     You have a fever.      You have redness, swelling, and tenderness around the rash.      You have trouble breathing.

## 2024-06-23 NOTE — ED PROVIDER NOTE - OBJECTIVE STATEMENT
49-year-old male with no significant past medical history presents to the ED complaining of persisting diffuse itchy rash that started about 2 weeks ago and has been persisting.  Rash started the day after he was exposed to poison ivy.  Patient presented to ED and was prescribed prednisone and initially did have some improvement in symptoms but they recurred prompting ED eval.  Rash is itchy, mostly on his arms, chest and upper legs.  Patient has also been trying to apply topical Benadryl cream but has not found much improvement.  He denies any other new exposures.  He denies other complaints. Pt denies fever, chills, nausea, vomiting, abdominal pain, diarrhea, headache, dizziness, weakness, chest pain, SOB, back pain, LOC, trauma, urinary symptoms, cough, calf pain/swelling, recent travel, recent surgery.

## 2024-06-23 NOTE — ED PROVIDER NOTE - NSPTACCESSSVCSAPPTDETAILS_ED_ALL_ED_FT
Pt with itchy rash x 2 weeks, partial improvement with prednisone. He should follow-up with derm if symptoms don't improve after treatment.

## 2024-06-23 NOTE — ED PROVIDER NOTE - CLINICAL SUMMARY MEDICAL DECISION MAKING FREE TEXT BOX
Healthy 49-year-old male presents to the ED for evaluation of persisting itchy rash x 2 weeks.  Rash started after he was exposed to poison ivy.  Initially he completed course of prednisone with some improvement but rash returned.  He denies other new exposures.  Exam consistent with likely poison ivy dermatitis.  Will do 15-day prednisone taper and add on permethrin for possible scabies given excoriation on hands.  Patient instructed to follow-up with dermatologist.  Return precautions provided, patient comfortable with discharge.

## 2024-06-23 NOTE — ED PROVIDER NOTE - PATIENT PORTAL LINK FT
You can access the FollowMyHealth Patient Portal offered by [facility] by registering at the following website: [email or web address]. By joining [employer]’s FollowMyHealth portal, you will also be able to view your health information using other applications (apps) compatible with our system.

## 2024-06-23 NOTE — ED ADULT TRIAGE NOTE - CHIEF COMPLAINT QUOTE
Pt states worsening rash with increased itching to whole body with small open blisters on hands x days. left arm swelling since yesterday.

## 2024-06-23 NOTE — ED PROVIDER NOTE - PHYSICAL EXAMINATION
VITAL SIGNS: I have reviewed nursing notes and confirm.  CONSTITUTIONAL: Well-developed; well-nourished; in no acute distress.  SKIN: Skin exam is warm and dry. (+) diffuse erythematous rash, with scattered raised areas/excoriations, non-tender, blanchable  HEAD: Normocephalic; atraumatic.  EYES: Conjunctiva and sclera clear.  ENT: No nasal discharge; airway clear. Oropharynx clear.   CARD:  Regular rate and rhythm.  RESP: Speaking in full sentences.   EXT: Normal ROM.   NEURO: Alert, oriented. Grossly unremarkable. No focal deficits.

## 2024-06-26 DIAGNOSIS — L23.7 ALLERGIC CONTACT DERMATITIS DUE TO PLANTS, EXCEPT FOOD: ICD-10-CM

## 2024-06-26 DIAGNOSIS — L29.9 PRURITUS, UNSPECIFIED: ICD-10-CM

## 2025-05-12 NOTE — ED PROVIDER NOTE - CROS ED GU ALL NEG
CATARACT SURGERY    POST-OPERATIVE INSTRUCTIONS    · Apply drops THREE times a day into operative eye for 30 days.    · DO NOT rub your eye    · Wear protective sunglasses during the day    · Resume moderate activity    · Bathe/shower/wash face normally    · DO NOT apply makeup around the operative eye for 1 week.         You should expect    - Blurry vision and halos for 24-48 hours    - Dilated pupil for 24-48 hours    - Scratchy feeling in the eye for 1-2 days    - Curved shadow in your peripheral vision for 2-3 weeks    - Occasional flickering of lights for up to 1 week    -If you experience severe pain or nausea, please call Dr Foy or the on-call doctor at [phone]    - Plan to see Dr Foy tomorrow .      [facility]    [address] [facility] [id number]    ** Most patients can drive the next day, but if you do not feel comfortable driving, please arrange for transportation.   negative...